# Patient Record
Sex: MALE | Race: WHITE | Employment: OTHER | URBAN - METROPOLITAN AREA
[De-identification: names, ages, dates, MRNs, and addresses within clinical notes are randomized per-mention and may not be internally consistent; named-entity substitution may affect disease eponyms.]

---

## 2017-07-19 ENCOUNTER — OFFICE VISIT (OUTPATIENT)
Dept: INTERNAL MEDICINE | Age: 67
End: 2017-07-19

## 2017-07-19 VITALS
SYSTOLIC BLOOD PRESSURE: 132 MMHG | WEIGHT: 216 LBS | DIASTOLIC BLOOD PRESSURE: 84 MMHG | OXYGEN SATURATION: 97 % | HEART RATE: 75 BPM

## 2017-07-19 DIAGNOSIS — Z00.00 ROUTINE GENERAL MEDICAL EXAMINATION AT A HEALTH CARE FACILITY: ICD-10-CM

## 2017-07-19 DIAGNOSIS — E11.9 TYPE 2 DIABETES MELLITUS WITHOUT COMPLICATION, WITHOUT LONG-TERM CURRENT USE OF INSULIN (HCC): ICD-10-CM

## 2017-07-19 DIAGNOSIS — N40.1 BENIGN NON-NODULAR PROSTATIC HYPERPLASIA WITH LOWER URINARY TRACT SYMPTOMS: ICD-10-CM

## 2017-07-19 DIAGNOSIS — Z12.5 ENCOUNTER FOR SCREENING FOR MALIGNANT NEOPLASM OF PROSTATE: ICD-10-CM

## 2017-07-19 DIAGNOSIS — N17.9 ACUTE RENAL FAILURE, UNSPECIFIED ACUTE RENAL FAILURE TYPE (HCC): ICD-10-CM

## 2017-07-19 DIAGNOSIS — J45.20 MILD INTERMITTENT ASTHMA WITHOUT COMPLICATION: ICD-10-CM

## 2017-07-19 DIAGNOSIS — I10 ESSENTIAL HYPERTENSION: ICD-10-CM

## 2017-07-19 DIAGNOSIS — E11.40 TYPE 2 DIABETES MELLITUS WITH DIABETIC NEUROPATHY, WITHOUT LONG-TERM CURRENT USE OF INSULIN (HCC): ICD-10-CM

## 2017-07-19 DIAGNOSIS — Z00.00 ENCOUNTER FOR MEDICARE ANNUAL WELLNESS EXAM: Primary | ICD-10-CM

## 2017-07-19 LAB
DIABETIC RETINOPATHY: NORMAL
HEMOCCULT STL QL: NEGATIVE
HEMOCCULT STL QL: NORMAL
HEMOCCULT STL QL: NORMAL

## 2017-07-19 PROCEDURE — G0402 INITIAL PREVENTIVE EXAM: HCPCS | Performed by: INTERNAL MEDICINE

## 2017-07-19 PROCEDURE — 4040F PNEUMOC VAC/ADMIN/RCVD: CPT | Performed by: INTERNAL MEDICINE

## 2017-07-19 PROCEDURE — G8421 BMI NOT CALCULATED: HCPCS | Performed by: INTERNAL MEDICINE

## 2017-07-19 PROCEDURE — 1036F TOBACCO NON-USER: CPT | Performed by: INTERNAL MEDICINE

## 2017-07-19 PROCEDURE — G0403 EKG FOR INITIAL PREVENT EXAM: HCPCS | Performed by: INTERNAL MEDICINE

## 2017-07-19 PROCEDURE — G8427 DOCREV CUR MEDS BY ELIG CLIN: HCPCS | Performed by: INTERNAL MEDICINE

## 2017-07-19 PROCEDURE — 82270 OCCULT BLOOD FECES: CPT | Performed by: INTERNAL MEDICINE

## 2017-07-19 PROCEDURE — 1123F ACP DISCUSS/DSCN MKR DOCD: CPT | Performed by: INTERNAL MEDICINE

## 2017-07-19 PROCEDURE — 3017F COLORECTAL CA SCREEN DOC REV: CPT | Performed by: INTERNAL MEDICINE

## 2017-07-19 PROCEDURE — 99202 OFFICE O/P NEW SF 15 MIN: CPT | Performed by: INTERNAL MEDICINE

## 2017-07-19 PROCEDURE — 3046F HEMOGLOBIN A1C LEVEL >9.0%: CPT | Performed by: INTERNAL MEDICINE

## 2017-07-19 RX ORDER — CYCLOBENZAPRINE HCL 10 MG
5 TABLET ORAL NIGHTLY
COMMUNITY
End: 2020-10-28 | Stop reason: ALTCHOICE

## 2017-07-19 RX ORDER — BUDESONIDE AND FORMOTEROL FUMARATE DIHYDRATE 160; 4.5 UG/1; UG/1
2 AEROSOL RESPIRATORY (INHALATION) 2 TIMES DAILY
Qty: 1 INHALER | Refills: 3
Start: 2017-07-19 | End: 2020-10-28 | Stop reason: ALTCHOICE

## 2017-07-19 RX ORDER — TAMSULOSIN HYDROCHLORIDE 0.4 MG/1
0.4 CAPSULE ORAL DAILY
Status: ON HOLD | COMMUNITY
End: 2017-07-26 | Stop reason: ALTCHOICE

## 2017-07-19 RX ORDER — LOSARTAN POTASSIUM 25 MG/1
25 TABLET ORAL DAILY
Status: ON HOLD | COMMUNITY
End: 2017-07-29 | Stop reason: HOSPADM

## 2017-07-19 RX ORDER — OMEPRAZOLE 20 MG/1
20 CAPSULE, DELAYED RELEASE ORAL DAILY
Status: ON HOLD | COMMUNITY
End: 2017-07-29 | Stop reason: HOSPADM

## 2017-07-19 RX ORDER — GLIPIZIDE 10 MG/1
10 TABLET ORAL 4 TIMES DAILY
COMMUNITY

## 2017-07-19 RX ORDER — ALBUTEROL SULFATE 90 UG/1
2 AEROSOL, METERED RESPIRATORY (INHALATION) EVERY 6 HOURS PRN
Qty: 1 INHALER | Refills: 3
Start: 2017-07-19 | End: 2020-10-28 | Stop reason: ALTCHOICE

## 2017-07-19 RX ORDER — DOCUSATE SODIUM 100 MG/1
100 CAPSULE, LIQUID FILLED ORAL 2 TIMES DAILY
Qty: 60 CAPSULE | Refills: 0
Start: 2017-07-19

## 2017-07-19 ASSESSMENT — ENCOUNTER SYMPTOMS
COLOR CHANGE: 0
ANAL BLEEDING: 0
EYE ITCHING: 0
RHINORRHEA: 0
EYE DISCHARGE: 0
WHEEZING: 0
CONSTIPATION: 0
TROUBLE SWALLOWING: 0
SINUS PRESSURE: 0
BLOOD IN STOOL: 0
VOMITING: 0
BACK PAIN: 0
STRIDOR: 0
APNEA: 0
DIARRHEA: 0
SHORTNESS OF BREATH: 0
RECTAL PAIN: 0
CHEST TIGHTNESS: 0
ABDOMINAL PAIN: 0
COUGH: 0
NAUSEA: 0
SORE THROAT: 0
EYE REDNESS: 0
FACIAL SWELLING: 0
PHOTOPHOBIA: 0
VOICE CHANGE: 0
CHOKING: 0
EYE PAIN: 0
ABDOMINAL DISTENTION: 0

## 2017-07-19 ASSESSMENT — LIFESTYLE VARIABLES
AUDIT TOTAL SCORE: 1
AUDIT-C TOTAL SCORE: 1
HOW OFTEN DO YOU HAVE A DRINK CONTAINING ALCOHOL: 1
HOW OFTEN DURING THE LAST YEAR HAVE YOU NEEDED AN ALCOHOLIC DRINK FIRST THING IN THE MORNING TO GET YOURSELF GOING AFTER A NIGHT OF HEAVY DRINKING: 0
HAVE YOU OR SOMEONE ELSE BEEN INJURED AS A RESULT OF YOUR DRINKING: 0
HAS A RELATIVE, FRIEND, DOCTOR, OR ANOTHER HEALTH PROFESSIONAL EXPRESSED CONCERN ABOUT YOUR DRINKING OR SUGGESTED YOU CUT DOWN: 0
HOW OFTEN DURING THE LAST YEAR HAVE YOU FAILED TO DO WHAT WAS NORMALLY EXPECTED FROM YOU BECAUSE OF DRINKING: 0
HOW OFTEN DURING THE LAST YEAR HAVE YOU FOUND THAT YOU WERE NOT ABLE TO STOP DRINKING ONCE YOU HAD STARTED: 0
HOW OFTEN DURING THE LAST YEAR HAVE YOU HAD A FEELING OF GUILT OR REMORSE AFTER DRINKING: 0
HOW OFTEN DURING THE LAST YEAR HAVE YOU BEEN UNABLE TO REMEMBER WHAT HAPPENED THE NIGHT BEFORE BECAUSE YOU HAD BEEN DRINKING: 0
HOW MANY STANDARD DRINKS CONTAINING ALCOHOL DO YOU HAVE ON A TYPICAL DAY: 0
HOW OFTEN DO YOU HAVE SIX OR MORE DRINKS ON ONE OCCASION: 0

## 2017-07-19 ASSESSMENT — ANXIETY QUESTIONNAIRES: GAD7 TOTAL SCORE: 1

## 2017-07-19 ASSESSMENT — PATIENT HEALTH QUESTIONNAIRE - PHQ9: SUM OF ALL RESPONSES TO PHQ QUESTIONS 1-9: 1

## 2017-07-25 ENCOUNTER — HOSPITAL ENCOUNTER (OUTPATIENT)
Dept: ULTRASOUND IMAGING | Age: 67
Discharge: HOME | End: 2017-07-25
Attending: INTERNAL MEDICINE | Admitting: INTERNAL MEDICINE

## 2017-07-25 DIAGNOSIS — N17.9 ACUTE RENAL FAILURE, UNSPECIFIED ACUTE RENAL FAILURE TYPE (HCC): ICD-10-CM

## 2017-07-25 DIAGNOSIS — I10 ESSENTIAL HYPERTENSION: ICD-10-CM

## 2017-07-25 DIAGNOSIS — E11.40 TYPE 2 DIABETES MELLITUS WITH DIABETIC NEUROPATHY, WITHOUT LONG-TERM CURRENT USE OF INSULIN (HCC): ICD-10-CM

## 2017-07-25 DIAGNOSIS — Z00.00 ENCOUNTER FOR MEDICARE ANNUAL WELLNESS EXAM: ICD-10-CM

## 2017-07-25 DIAGNOSIS — E11.40 TYPE 2 DIABETES MELLITUS WITH DIABETIC NEUROPATHY (HCC): ICD-10-CM

## 2017-07-25 DIAGNOSIS — Z12.5 ENCOUNTER FOR SCREENING FOR MALIGNANT NEOPLASM OF PROSTATE: ICD-10-CM

## 2017-07-25 PROBLEM — N30.00 ACUTE CYSTITIS: Status: ACTIVE | Noted: 2017-07-25

## 2017-07-25 LAB
A/G RATIO: 0.5 (ref 1.1–2.2)
ALBUMIN SERPL-MCNC: 3.4 G/DL (ref 3.4–5)
ALP BLD-CCNC: 323 U/L (ref 40–129)
ALT SERPL-CCNC: 20 U/L (ref 10–40)
ANION GAP SERPL CALCULATED.3IONS-SCNC: 19 MMOL/L (ref 3–16)
AST SERPL-CCNC: 13 U/L (ref 15–37)
BACTERIA: ABNORMAL /HPF
BASOPHILS ABSOLUTE: 0.1 K/UL (ref 0–0.2)
BASOPHILS RELATIVE PERCENT: 0.7 %
BILIRUB SERPL-MCNC: 0.3 MG/DL (ref 0–1)
BILIRUBIN URINE: NEGATIVE
BLOOD, URINE: ABNORMAL
BUN BLDV-MCNC: 52 MG/DL (ref 7–20)
CALCIUM SERPL-MCNC: 9.8 MG/DL (ref 8.3–10.6)
CHLORIDE BLD-SCNC: 97 MMOL/L (ref 99–110)
CHOLESTEROL, TOTAL: 133 MG/DL (ref 0–199)
CLARITY: ABNORMAL
CO2: 20 MMOL/L (ref 21–32)
COLOR: YELLOW
CREAT SERPL-MCNC: 2.9 MG/DL (ref 0.8–1.3)
CREATININE URINE: 70.8 MG/DL (ref 39–259)
EOSINOPHILS ABSOLUTE: 0.6 K/UL (ref 0–0.6)
EOSINOPHILS RELATIVE PERCENT: 6 %
EPITHELIAL CELLS, UA: ABNORMAL /HPF
GFR AFRICAN AMERICAN: 27
GFR NON-AFRICAN AMERICAN: 22
GLOBULIN: 6.2 G/DL
GLUCOSE BLD-MCNC: 234 MG/DL (ref 70–99)
GLUCOSE URINE: NEGATIVE MG/DL
HCT VFR BLD CALC: 34 % (ref 40.5–52.5)
HDLC SERPL-MCNC: 43 MG/DL (ref 40–60)
HEMOGLOBIN: 11 G/DL (ref 13.5–17.5)
KETONES, URINE: NEGATIVE MG/DL
LDL CHOLESTEROL CALCULATED: 53 MG/DL
LEUKOCYTE ESTERASE, URINE: ABNORMAL
LYMPHOCYTES ABSOLUTE: 2 K/UL (ref 1–5.1)
LYMPHOCYTES RELATIVE PERCENT: 21.8 %
MCH RBC QN AUTO: 27 PG (ref 26–34)
MCHC RBC AUTO-ENTMCNC: 32.3 G/DL (ref 31–36)
MCV RBC AUTO: 83.7 FL (ref 80–100)
MICROALBUMIN UR-MCNC: 36.5 MG/DL
MICROALBUMIN/CREAT UR-RTO: 515.5 MG/G (ref 0–30)
MICROSCOPIC EXAMINATION: YES
MONOCYTES ABSOLUTE: 1 K/UL (ref 0–1.3)
MONOCYTES RELATIVE PERCENT: 10.9 %
NEUTROPHILS ABSOLUTE: 5.6 K/UL (ref 1.7–7.7)
NEUTROPHILS RELATIVE PERCENT: 60.6 %
NITRITE, URINE: NEGATIVE
PDW BLD-RTO: 16.1 % (ref 12.4–15.4)
PH UA: 6
PLATELET # BLD: 557 K/UL (ref 135–450)
PMV BLD AUTO: 7.7 FL (ref 5–10.5)
POTASSIUM SERPL-SCNC: 4.8 MMOL/L (ref 3.5–5.1)
PROSTATE SPECIFIC ANTIGEN: 1.69 NG/ML (ref 0–4)
PROTEIN UA: 100 MG/DL
RBC # BLD: 4.06 M/UL (ref 4.2–5.9)
RBC UA: ABNORMAL /HPF (ref 0–2)
SODIUM BLD-SCNC: 136 MMOL/L (ref 136–145)
SPECIFIC GRAVITY UA: 1.01
TOTAL PROTEIN: 9.6 G/DL (ref 6.4–8.2)
TRIGL SERPL-MCNC: 184 MG/DL (ref 0–150)
TSH SERPL DL<=0.05 MIU/L-ACNC: 1.43 UIU/ML (ref 0.27–4.2)
UROBILINOGEN, URINE: 0.2 E.U./DL
VLDLC SERPL CALC-MCNC: 37 MG/DL
WBC # BLD: 9.3 K/UL (ref 4–11)
WBC UA: >100 /HPF (ref 0–5)
YEAST: PRESENT /HPF

## 2017-07-26 LAB
ESTIMATED AVERAGE GLUCOSE: 211.6 MG/DL
HBA1C MFR BLD: 9 %

## 2017-08-07 ENCOUNTER — TELEPHONE (OUTPATIENT)
Dept: INTERNAL MEDICINE | Age: 67
End: 2017-08-07

## 2017-08-08 ENCOUNTER — OFFICE VISIT (OUTPATIENT)
Dept: INFECTIOUS DISEASES | Age: 67
End: 2017-08-08

## 2017-08-08 VITALS
BODY MASS INDEX: 31.96 KG/M2 | DIASTOLIC BLOOD PRESSURE: 64 MMHG | SYSTOLIC BLOOD PRESSURE: 112 MMHG | TEMPERATURE: 98 F | WEIGHT: 198 LBS

## 2017-08-08 DIAGNOSIS — N39.0 COMPLICATED UTI (URINARY TRACT INFECTION): Primary | ICD-10-CM

## 2017-08-08 PROCEDURE — 1123F ACP DISCUSS/DSCN MKR DOCD: CPT | Performed by: INTERNAL MEDICINE

## 2017-08-08 PROCEDURE — 3017F COLORECTAL CA SCREEN DOC REV: CPT | Performed by: INTERNAL MEDICINE

## 2017-08-08 PROCEDURE — 4040F PNEUMOC VAC/ADMIN/RCVD: CPT | Performed by: INTERNAL MEDICINE

## 2017-08-08 PROCEDURE — G8427 DOCREV CUR MEDS BY ELIG CLIN: HCPCS | Performed by: INTERNAL MEDICINE

## 2017-08-08 PROCEDURE — G8417 CALC BMI ABV UP PARAM F/U: HCPCS | Performed by: INTERNAL MEDICINE

## 2017-08-08 PROCEDURE — 99214 OFFICE O/P EST MOD 30 MIN: CPT | Performed by: INTERNAL MEDICINE

## 2017-08-08 PROCEDURE — 1036F TOBACCO NON-USER: CPT | Performed by: INTERNAL MEDICINE

## 2017-08-08 PROCEDURE — 1111F DSCHRG MED/CURRENT MED MERGE: CPT | Performed by: INTERNAL MEDICINE

## 2017-08-17 ENCOUNTER — OFFICE VISIT (OUTPATIENT)
Dept: INTERNAL MEDICINE | Age: 67
End: 2017-08-17

## 2017-08-17 VITALS
DIASTOLIC BLOOD PRESSURE: 88 MMHG | SYSTOLIC BLOOD PRESSURE: 140 MMHG | HEART RATE: 87 BPM | HEIGHT: 66 IN | WEIGHT: 197 LBS | TEMPERATURE: 97.7 F | BODY MASS INDEX: 31.66 KG/M2 | OXYGEN SATURATION: 98 %

## 2017-08-17 DIAGNOSIS — I10 ESSENTIAL HYPERTENSION: ICD-10-CM

## 2017-08-17 DIAGNOSIS — N39.0 COMPLICATED UTI (URINARY TRACT INFECTION): ICD-10-CM

## 2017-08-17 DIAGNOSIS — Z01.818 PRE-OP EVALUATION: ICD-10-CM

## 2017-08-17 DIAGNOSIS — Z01.818 PRE-OP EVALUATION: Primary | ICD-10-CM

## 2017-08-17 DIAGNOSIS — E87.1 HYPONATREMIA: Primary | ICD-10-CM

## 2017-08-17 DIAGNOSIS — E11.9 TYPE 2 DIABETES MELLITUS WITHOUT COMPLICATION, WITHOUT LONG-TERM CURRENT USE OF INSULIN (HCC): ICD-10-CM

## 2017-08-17 LAB
ANION GAP SERPL CALCULATED.3IONS-SCNC: 15 MMOL/L (ref 3–16)
BUN BLDV-MCNC: 39 MG/DL (ref 7–20)
CALCIUM SERPL-MCNC: 9.4 MG/DL (ref 8.3–10.6)
CHLORIDE BLD-SCNC: 90 MMOL/L (ref 99–110)
CO2: 23 MMOL/L (ref 21–32)
CREAT SERPL-MCNC: 1.4 MG/DL (ref 0.8–1.3)
GFR AFRICAN AMERICAN: >60
GFR NON-AFRICAN AMERICAN: 51
GLUCOSE BLD-MCNC: 435 MG/DL (ref 70–99)
HCT VFR BLD CALC: 39.5 % (ref 40.5–52.5)
HEMOGLOBIN: 13 G/DL (ref 13.5–17.5)
MCH RBC QN AUTO: 27.3 PG (ref 26–34)
MCHC RBC AUTO-ENTMCNC: 32.9 G/DL (ref 31–36)
MCV RBC AUTO: 82.8 FL (ref 80–100)
PDW BLD-RTO: 18.5 % (ref 12.4–15.4)
PLATELET # BLD: 208 K/UL (ref 135–450)
PMV BLD AUTO: 8.9 FL (ref 5–10.5)
POTASSIUM SERPL-SCNC: 4.5 MMOL/L (ref 3.5–5.1)
RBC # BLD: 4.77 M/UL (ref 4.2–5.9)
SODIUM BLD-SCNC: 128 MMOL/L (ref 136–145)
WBC # BLD: 8.4 K/UL (ref 4–11)

## 2017-08-17 PROCEDURE — 4040F PNEUMOC VAC/ADMIN/RCVD: CPT | Performed by: NURSE PRACTITIONER

## 2017-08-17 PROCEDURE — G8427 DOCREV CUR MEDS BY ELIG CLIN: HCPCS | Performed by: NURSE PRACTITIONER

## 2017-08-17 PROCEDURE — G8417 CALC BMI ABV UP PARAM F/U: HCPCS | Performed by: NURSE PRACTITIONER

## 2017-08-17 PROCEDURE — 99215 OFFICE O/P EST HI 40 MIN: CPT | Performed by: NURSE PRACTITIONER

## 2017-08-17 PROCEDURE — 1036F TOBACCO NON-USER: CPT | Performed by: NURSE PRACTITIONER

## 2017-08-17 PROCEDURE — 3017F COLORECTAL CA SCREEN DOC REV: CPT | Performed by: NURSE PRACTITIONER

## 2017-08-17 RX ORDER — AMLODIPINE BESYLATE 10 MG/1
10 TABLET ORAL DAILY
Qty: 30 TABLET | Refills: 3 | Status: SHIPPED | OUTPATIENT
Start: 2017-08-17 | End: 2017-10-17

## 2017-08-17 ASSESSMENT — ENCOUNTER SYMPTOMS
BLOOD IN STOOL: 0
BACK PAIN: 0
NAUSEA: 0
EYE DISCHARGE: 0
SHORTNESS OF BREATH: 0
HEARTBURN: 0
CONSTIPATION: 0
DIARRHEA: 0
COUGH: 0
SORE THROAT: 0
BLURRED VISION: 0
ORTHOPNEA: 0
EYE PAIN: 0
VOMITING: 0
ABDOMINAL PAIN: 0
STRIDOR: 0
HEMOPTYSIS: 0
PHOTOPHOBIA: 0
SPUTUM PRODUCTION: 0
WHEEZING: 0
EYE REDNESS: 0
DOUBLE VISION: 0

## 2017-08-18 ENCOUNTER — PATIENT MESSAGE (OUTPATIENT)
Dept: INTERNAL MEDICINE | Age: 67
End: 2017-08-18

## 2017-08-22 ENCOUNTER — TELEPHONE (OUTPATIENT)
Dept: INTERNAL MEDICINE | Age: 67
End: 2017-08-22

## 2017-08-22 DIAGNOSIS — E87.1 HYPONATREMIA: ICD-10-CM

## 2017-08-22 LAB
ANION GAP SERPL CALCULATED.3IONS-SCNC: 15 MMOL/L (ref 3–16)
BUN BLDV-MCNC: 35 MG/DL (ref 7–20)
CALCIUM SERPL-MCNC: 9.3 MG/DL (ref 8.3–10.6)
CHLORIDE BLD-SCNC: 96 MMOL/L (ref 99–110)
CO2: 23 MMOL/L (ref 21–32)
CREAT SERPL-MCNC: 1.1 MG/DL (ref 0.8–1.3)
GFR AFRICAN AMERICAN: >60
GFR NON-AFRICAN AMERICAN: >60
GLUCOSE BLD-MCNC: 275 MG/DL (ref 70–99)
POTASSIUM SERPL-SCNC: 4.2 MMOL/L (ref 3.5–5.1)
SODIUM BLD-SCNC: 134 MMOL/L (ref 136–145)

## 2017-08-30 DIAGNOSIS — E11.9 TYPE 2 DIABETES MELLITUS WITHOUT COMPLICATION, UNSPECIFIED LONG TERM INSULIN USE STATUS: Primary | ICD-10-CM

## 2017-09-11 ENCOUNTER — OFFICE VISIT (OUTPATIENT)
Dept: INTERNAL MEDICINE | Age: 67
End: 2017-09-11

## 2017-09-11 ENCOUNTER — TELEPHONE (OUTPATIENT)
Dept: INTERNAL MEDICINE | Age: 67
End: 2017-09-11

## 2017-09-11 VITALS
HEART RATE: 98 BPM | BODY MASS INDEX: 33.91 KG/M2 | HEIGHT: 66 IN | SYSTOLIC BLOOD PRESSURE: 122 MMHG | WEIGHT: 211 LBS | DIASTOLIC BLOOD PRESSURE: 78 MMHG

## 2017-09-11 DIAGNOSIS — N40.1 BENIGN NON-NODULAR PROSTATIC HYPERPLASIA WITH LOWER URINARY TRACT SYMPTOMS: ICD-10-CM

## 2017-09-11 DIAGNOSIS — E11.9 TYPE 2 DIABETES MELLITUS WITHOUT COMPLICATION, UNSPECIFIED LONG TERM INSULIN USE STATUS: ICD-10-CM

## 2017-09-11 DIAGNOSIS — F51.01 PRIMARY INSOMNIA: Primary | ICD-10-CM

## 2017-09-11 DIAGNOSIS — E11.9 TYPE 2 DIABETES MELLITUS WITHOUT COMPLICATION, UNSPECIFIED LONG TERM INSULIN USE STATUS: Primary | ICD-10-CM

## 2017-09-11 DIAGNOSIS — F51.01 PRIMARY INSOMNIA: ICD-10-CM

## 2017-09-11 PROCEDURE — 4040F PNEUMOC VAC/ADMIN/RCVD: CPT | Performed by: NURSE PRACTITIONER

## 2017-09-11 PROCEDURE — 3017F COLORECTAL CA SCREEN DOC REV: CPT | Performed by: NURSE PRACTITIONER

## 2017-09-11 PROCEDURE — 3046F HEMOGLOBIN A1C LEVEL >9.0%: CPT | Performed by: NURSE PRACTITIONER

## 2017-09-11 PROCEDURE — G8417 CALC BMI ABV UP PARAM F/U: HCPCS | Performed by: NURSE PRACTITIONER

## 2017-09-11 PROCEDURE — G8427 DOCREV CUR MEDS BY ELIG CLIN: HCPCS | Performed by: NURSE PRACTITIONER

## 2017-09-11 PROCEDURE — 99214 OFFICE O/P EST MOD 30 MIN: CPT | Performed by: NURSE PRACTITIONER

## 2017-09-11 PROCEDURE — 1123F ACP DISCUSS/DSCN MKR DOCD: CPT | Performed by: NURSE PRACTITIONER

## 2017-09-11 PROCEDURE — 1036F TOBACCO NON-USER: CPT | Performed by: NURSE PRACTITIONER

## 2017-09-11 RX ORDER — SPIRONOLACTONE 25 MG/1
25 TABLET ORAL DAILY
COMMUNITY
End: 2018-01-23

## 2017-09-11 ASSESSMENT — ENCOUNTER SYMPTOMS
SHORTNESS OF BREATH: 0
WHEEZING: 0
COUGH: 0

## 2017-09-12 ENCOUNTER — HOSPITAL ENCOUNTER (OUTPATIENT)
Dept: OTHER | Age: 67
Discharge: HOME | End: 2017-09-12
Attending: PHYSICIAN ASSISTANT | Admitting: PHYSICIAN ASSISTANT

## 2017-09-12 LAB
ANION GAP SERPL CALCULATED.3IONS-SCNC: 13 MMOL/L (ref 3–16)
BUN BLDV-MCNC: 27 MG/DL (ref 7–20)
CALCIUM SERPL-MCNC: 8.9 MG/DL (ref 8.3–10.6)
CHLORIDE BLD-SCNC: 102 MMOL/L (ref 99–110)
CO2: 25 MMOL/L (ref 21–32)
CREAT SERPL-MCNC: 1 MG/DL (ref 0.8–1.3)
ESTIMATED AVERAGE GLUCOSE: 257.5 MG/DL
GFR AFRICAN AMERICAN: >60
GFR NON-AFRICAN AMERICAN: >60
GLUCOSE BLD-MCNC: 173 MG/DL (ref 70–99)
HBA1C MFR BLD: 10.6 %
POTASSIUM SERPL-SCNC: 4.2 MMOL/L (ref 3.5–5.1)
SODIUM BLD-SCNC: 140 MMOL/L (ref 136–145)

## 2017-09-19 ENCOUNTER — TELEPHONE (OUTPATIENT)
Dept: INFECTIOUS DISEASES | Age: 67
End: 2017-09-19

## 2017-09-20 ENCOUNTER — TELEPHONE (OUTPATIENT)
Dept: INFECTIOUS DISEASES | Age: 67
End: 2017-09-20

## 2017-10-17 ENCOUNTER — OFFICE VISIT (OUTPATIENT)
Dept: INTERNAL MEDICINE | Age: 67
End: 2017-10-17

## 2017-10-17 VITALS
OXYGEN SATURATION: 98 % | HEIGHT: 66 IN | DIASTOLIC BLOOD PRESSURE: 64 MMHG | SYSTOLIC BLOOD PRESSURE: 120 MMHG | HEART RATE: 72 BPM | WEIGHT: 210.8 LBS | BODY MASS INDEX: 33.88 KG/M2

## 2017-10-17 DIAGNOSIS — Z23 NEED FOR INFLUENZA VACCINATION: ICD-10-CM

## 2017-10-17 DIAGNOSIS — F51.01 PRIMARY INSOMNIA: ICD-10-CM

## 2017-10-17 DIAGNOSIS — I10 ESSENTIAL HYPERTENSION: ICD-10-CM

## 2017-10-17 DIAGNOSIS — J45.20 MILD INTERMITTENT ASTHMA WITHOUT COMPLICATION: ICD-10-CM

## 2017-10-17 DIAGNOSIS — E11.9 TYPE 2 DIABETES MELLITUS WITHOUT COMPLICATION, UNSPECIFIED LONG TERM INSULIN USE STATUS: Primary | ICD-10-CM

## 2017-10-17 DIAGNOSIS — Z23 NEED FOR PNEUMOCOCCAL VACCINATION: ICD-10-CM

## 2017-10-17 PROBLEM — N30.00 ACUTE CYSTITIS: Status: RESOLVED | Noted: 2017-07-25 | Resolved: 2017-10-17

## 2017-10-17 LAB — HBA1C MFR BLD: 8.2 %

## 2017-10-17 PROCEDURE — 1036F TOBACCO NON-USER: CPT | Performed by: INTERNAL MEDICINE

## 2017-10-17 PROCEDURE — 90662 IIV NO PRSV INCREASED AG IM: CPT | Performed by: INTERNAL MEDICINE

## 2017-10-17 PROCEDURE — 1123F ACP DISCUSS/DSCN MKR DOCD: CPT | Performed by: INTERNAL MEDICINE

## 2017-10-17 PROCEDURE — G8427 DOCREV CUR MEDS BY ELIG CLIN: HCPCS | Performed by: INTERNAL MEDICINE

## 2017-10-17 PROCEDURE — 83036 HEMOGLOBIN GLYCOSYLATED A1C: CPT | Performed by: INTERNAL MEDICINE

## 2017-10-17 PROCEDURE — 3046F HEMOGLOBIN A1C LEVEL >9.0%: CPT | Performed by: INTERNAL MEDICINE

## 2017-10-17 PROCEDURE — 90670 PCV13 VACCINE IM: CPT | Performed by: INTERNAL MEDICINE

## 2017-10-17 PROCEDURE — 3017F COLORECTAL CA SCREEN DOC REV: CPT | Performed by: INTERNAL MEDICINE

## 2017-10-17 PROCEDURE — 4040F PNEUMOC VAC/ADMIN/RCVD: CPT | Performed by: INTERNAL MEDICINE

## 2017-10-17 PROCEDURE — G8484 FLU IMMUNIZE NO ADMIN: HCPCS | Performed by: INTERNAL MEDICINE

## 2017-10-17 PROCEDURE — G0008 ADMIN INFLUENZA VIRUS VAC: HCPCS | Performed by: INTERNAL MEDICINE

## 2017-10-17 PROCEDURE — G0009 ADMIN PNEUMOCOCCAL VACCINE: HCPCS | Performed by: INTERNAL MEDICINE

## 2017-10-17 PROCEDURE — 99214 OFFICE O/P EST MOD 30 MIN: CPT | Performed by: INTERNAL MEDICINE

## 2017-10-17 PROCEDURE — G8417 CALC BMI ABV UP PARAM F/U: HCPCS | Performed by: INTERNAL MEDICINE

## 2017-10-17 RX ORDER — ZALEPLON 10 MG/1
10 CAPSULE ORAL NIGHTLY
Qty: 30 CAPSULE | Refills: 0 | Status: SHIPPED | OUTPATIENT
Start: 2017-10-17 | End: 2017-11-15 | Stop reason: SDUPTHER

## 2017-10-17 ASSESSMENT — ENCOUNTER SYMPTOMS
SHORTNESS OF BREATH: 0
CHEST TIGHTNESS: 0
WHEEZING: 0
RESPIRATORY NEGATIVE: 1
GASTROINTESTINAL NEGATIVE: 1

## 2017-10-17 NOTE — PROGRESS NOTES
Subjective:      Patient ID: Bryant Cabot is a 74 y.o. male. HPI    Patient is here for a recheck    Vitals:    10/17/17 1128   BP: 120/64   Pulse: 72   SpO2: 98%       Wt Readings from Last 3 Encounters:   10/17/17 210 lb 12.8 oz (95.6 kg)   09/11/17 211 lb (95.7 kg)   08/17/17 197 lb (89.4 kg)     Blood sugar is running . He is taking Glipizide, Metformin and [place]. Patient is taking blood pressure medication without problem        Discussed use, benefit, and side effects of prescribed medications. Barriers to medication compliance addressed. All patient questions answered. Pt voiced understanding. Review of Systems   Constitutional: Negative. HENT: Negative. Respiratory: Negative. Negative for chest tightness, shortness of breath and wheezing. Cardiovascular: Negative. Negative for chest pain, palpitations and leg swelling. Gastrointestinal: Negative. Genitourinary: Negative. Musculoskeletal: Negative for arthralgias and myalgias. Neurological: Negative. Objective:   Physical Exam   Constitutional: He appears well-developed and well-nourished. Neck: Trachea normal. No JVD present. Carotid bruit is not present. No edema present. Cardiovascular: Normal rate, regular rhythm, S1 normal and S2 normal.    No murmur heard. Pulmonary/Chest: Effort normal and breath sounds normal. He has no wheezes. He has no rhonchi. He has no rales.        Assessment:      See Problem List assessment and plan       Plan:      Diabetes mellitus, type 2 (HCC)  Blood sugar improved  Check HgbA1c  Consider stopping [place]    Hypertension  BP stable  Continue present treatment      Asthma  Breathing well    Primary insomnia  Discussed issues  No help with Belsomra, melatonin, Benadryl  Trial [facility]

## 2017-10-17 NOTE — PATIENT INSTRUCTIONS
Patient Self-Management Goal for Chronic Condition  Goal: I will schedule the recommended follow up visit when leaving the office today, and agree to keep the appointment or to reschedule when I call to cancel. Barriers to success: none  Plan for overcoming my barriers: N/A     Confidence: 10/10  Date goal set: 10/17/17  Date goal attained:   Self- Management Goals for the Patient with High Cholesterol: It is important to have goals to work towards when you have High Cholesterol. Below is a list of goals your doctor would like you to work towards to help control your hyperlipidemia and also maintain and improve your overall health. Please select one of these goals to try before your next follow up visit:    Goal: I will schedule my follow-up appointment for high cholesterol at checkout desk today. I will keep the appointment or, if necessary, reschedule within a reasonable timeframe. Guess your barriers before they happen. Everyone runs into barriers to their goals. You may already know what's going to get in your way. Write down these problems (cost? time? stress? fear?), and think of ways to get around them.      Barriers to success: none  Plan for overcoming my barriers: N/A     Confidence: 10/10  Date goal set: 10/17/17  Date goal attained:

## 2017-11-15 RX ORDER — ZALEPLON 10 MG/1
CAPSULE ORAL
Qty: 30 CAPSULE | Refills: 0 | Status: SHIPPED | OUTPATIENT
Start: 2017-11-15 | End: 2018-01-23

## 2018-01-23 ENCOUNTER — OFFICE VISIT (OUTPATIENT)
Dept: INTERNAL MEDICINE | Age: 68
End: 2018-01-23

## 2018-01-23 VITALS
HEIGHT: 66 IN | WEIGHT: 212 LBS | HEART RATE: 74 BPM | DIASTOLIC BLOOD PRESSURE: 68 MMHG | SYSTOLIC BLOOD PRESSURE: 126 MMHG | OXYGEN SATURATION: 98 % | BODY MASS INDEX: 34.07 KG/M2

## 2018-01-23 DIAGNOSIS — R35.0 BENIGN PROSTATIC HYPERPLASIA WITH URINARY FREQUENCY: ICD-10-CM

## 2018-01-23 DIAGNOSIS — E11.9 TYPE 2 DIABETES MELLITUS WITHOUT COMPLICATION, WITHOUT LONG-TERM CURRENT USE OF INSULIN (HCC): ICD-10-CM

## 2018-01-23 DIAGNOSIS — I10 ESSENTIAL HYPERTENSION: ICD-10-CM

## 2018-01-23 DIAGNOSIS — N40.1 BENIGN PROSTATIC HYPERPLASIA WITH URINARY FREQUENCY: ICD-10-CM

## 2018-01-23 DIAGNOSIS — J45.20 MILD INTERMITTENT ASTHMA WITHOUT COMPLICATION: ICD-10-CM

## 2018-01-23 PROCEDURE — 99214 OFFICE O/P EST MOD 30 MIN: CPT | Performed by: INTERNAL MEDICINE

## 2018-01-23 PROCEDURE — 4040F PNEUMOC VAC/ADMIN/RCVD: CPT | Performed by: INTERNAL MEDICINE

## 2018-01-23 PROCEDURE — G8417 CALC BMI ABV UP PARAM F/U: HCPCS | Performed by: INTERNAL MEDICINE

## 2018-01-23 PROCEDURE — G8427 DOCREV CUR MEDS BY ELIG CLIN: HCPCS | Performed by: INTERNAL MEDICINE

## 2018-01-23 PROCEDURE — 1123F ACP DISCUSS/DSCN MKR DOCD: CPT | Performed by: INTERNAL MEDICINE

## 2018-01-23 PROCEDURE — 3017F COLORECTAL CA SCREEN DOC REV: CPT | Performed by: INTERNAL MEDICINE

## 2018-01-23 PROCEDURE — G8484 FLU IMMUNIZE NO ADMIN: HCPCS | Performed by: INTERNAL MEDICINE

## 2018-01-23 PROCEDURE — 1036F TOBACCO NON-USER: CPT | Performed by: INTERNAL MEDICINE

## 2018-01-23 PROCEDURE — 3046F HEMOGLOBIN A1C LEVEL >9.0%: CPT | Performed by: INTERNAL MEDICINE

## 2018-01-23 RX ORDER — VITAMIN B COMPLEX
1 CAPSULE ORAL DAILY
COMMUNITY

## 2018-01-23 RX ORDER — LOSARTAN POTASSIUM 50 MG/1
50 TABLET ORAL
COMMUNITY

## 2018-01-23 RX ORDER — TAMSULOSIN HYDROCHLORIDE 0.4 MG/1
0.4 CAPSULE ORAL DAILY
COMMUNITY

## 2018-01-23 RX ORDER — LATANOPROST 50 UG/ML
1 SOLUTION/ DROPS OPHTHALMIC NIGHTLY
COMMUNITY

## 2018-01-23 RX ORDER — M-VIT,TX,IRON,MINS/CALC/FOLIC 27MG-0.4MG
1 TABLET ORAL DAILY
COMMUNITY

## 2018-01-23 ASSESSMENT — ENCOUNTER SYMPTOMS
GASTROINTESTINAL NEGATIVE: 1
RESPIRATORY NEGATIVE: 1
WHEEZING: 0
CHEST TIGHTNESS: 0
SHORTNESS OF BREATH: 0

## 2018-02-01 ENCOUNTER — HOSPITAL ENCOUNTER (OUTPATIENT)
Dept: OTHER | Age: 68
Discharge: HOME | End: 2018-02-01
Attending: INTERNAL MEDICINE | Admitting: INTERNAL MEDICINE

## 2018-02-01 DIAGNOSIS — I10 ESSENTIAL HYPERTENSION: ICD-10-CM

## 2018-02-01 DIAGNOSIS — E11.9 TYPE 2 DIABETES MELLITUS WITHOUT COMPLICATION, WITHOUT LONG-TERM CURRENT USE OF INSULIN (HCC): ICD-10-CM

## 2018-02-01 DIAGNOSIS — J45.20 MILD INTERMITTENT ASTHMA WITHOUT COMPLICATION: ICD-10-CM

## 2018-02-01 LAB
A/G RATIO: 1.2 (ref 1.1–2.2)
ALBUMIN SERPL-MCNC: 4.2 G/DL (ref 3.4–5)
ALP BLD-CCNC: 87 U/L (ref 40–129)
ALT SERPL-CCNC: 18 U/L (ref 10–40)
ANION GAP SERPL CALCULATED.3IONS-SCNC: 11 MMOL/L (ref 3–16)
AST SERPL-CCNC: 20 U/L (ref 15–37)
BACTERIA: ABNORMAL /HPF
BASOPHILS ABSOLUTE: 0.1 K/UL (ref 0–0.2)
BASOPHILS RELATIVE PERCENT: 0.8 %
BILIRUB SERPL-MCNC: 0.3 MG/DL (ref 0–1)
BILIRUBIN URINE: NEGATIVE
BLOOD, URINE: ABNORMAL
BUN BLDV-MCNC: 26 MG/DL (ref 7–20)
CALCIUM SERPL-MCNC: 9.3 MG/DL (ref 8.3–10.6)
CHLORIDE BLD-SCNC: 100 MMOL/L (ref 99–110)
CHOLESTEROL, TOTAL: 146 MG/DL (ref 0–199)
CLARITY: CLEAR
CO2: 27 MMOL/L (ref 21–32)
COLOR: YELLOW
CREAT SERPL-MCNC: 1.1 MG/DL (ref 0.8–1.3)
CREATININE URINE: 58.3 MG/DL (ref 39–259)
EOSINOPHILS ABSOLUTE: 0.4 K/UL (ref 0–0.6)
EOSINOPHILS RELATIVE PERCENT: 6.6 %
GFR AFRICAN AMERICAN: >60
GFR NON-AFRICAN AMERICAN: >60
GLOBULIN: 3.5 G/DL
GLUCOSE BLD-MCNC: 122 MG/DL (ref 70–99)
GLUCOSE URINE: NEGATIVE MG/DL
HCT VFR BLD CALC: 43 % (ref 40.5–52.5)
HDLC SERPL-MCNC: 62 MG/DL (ref 40–60)
HEMOGLOBIN: 14.2 G/DL (ref 13.5–17.5)
KETONES, URINE: NEGATIVE MG/DL
LDL CHOLESTEROL CALCULATED: 45 MG/DL
LEUKOCYTE ESTERASE, URINE: ABNORMAL
LYMPHOCYTES ABSOLUTE: 2.3 K/UL (ref 1–5.1)
LYMPHOCYTES RELATIVE PERCENT: 35.1 %
MCH RBC QN AUTO: 28 PG (ref 26–34)
MCHC RBC AUTO-ENTMCNC: 33.1 G/DL (ref 31–36)
MCV RBC AUTO: 84.4 FL (ref 80–100)
MICROALBUMIN UR-MCNC: 10.1 MG/DL
MICROALBUMIN/CREAT UR-RTO: 173.2 MG/G (ref 0–30)
MICROSCOPIC EXAMINATION: YES
MONOCYTES ABSOLUTE: 0.7 K/UL (ref 0–1.3)
MONOCYTES RELATIVE PERCENT: 10.1 %
NEUTROPHILS ABSOLUTE: 3.1 K/UL (ref 1.7–7.7)
NEUTROPHILS RELATIVE PERCENT: 47.4 %
NITRITE, URINE: NEGATIVE
PDW BLD-RTO: 16.5 % (ref 12.4–15.4)
PH UA: 5.5
PHOSPHORUS: 3.3 MG/DL (ref 2.5–4.9)
PLATELET # BLD: 272 K/UL (ref 135–450)
PMV BLD AUTO: 7.6 FL (ref 5–10.5)
POTASSIUM SERPL-SCNC: 4.7 MMOL/L (ref 3.5–5.1)
PROTEIN UA: 30 MG/DL
RBC # BLD: 5.09 M/UL (ref 4.2–5.9)
RBC UA: ABNORMAL /HPF (ref 0–2)
SODIUM BLD-SCNC: 138 MMOL/L (ref 136–145)
SPECIFIC GRAVITY UA: 1.02
TOTAL PROTEIN: 7.7 G/DL (ref 6.4–8.2)
TRIGL SERPL-MCNC: 195 MG/DL (ref 0–150)
UROBILINOGEN, URINE: 0.2 E.U./DL
VLDLC SERPL CALC-MCNC: 39 MG/DL
WBC # BLD: 6.6 K/UL (ref 4–11)
WBC UA: >100 /HPF (ref 0–5)

## 2018-02-02 LAB
ESTIMATED AVERAGE GLUCOSE: 177.2 MG/DL
HBA1C MFR BLD: 7.8 %

## 2018-02-05 ENCOUNTER — CARE COORDINATION (OUTPATIENT)
Dept: CARE COORDINATION | Age: 68
End: 2018-02-05

## 2018-02-06 DIAGNOSIS — R82.90 ABNORMAL URINE: Primary | ICD-10-CM

## 2018-02-22 NOTE — CARE COORDINATION
Patient Excluded from Care Coordination?  Yes     The patient will be excluded from Care Coordination for the following reason: Patient unable to contact to enroll

## 2018-03-09 ENCOUNTER — HOSPITAL ENCOUNTER (OUTPATIENT)
Dept: ULTRASOUND IMAGING | Age: 68
Discharge: HOME | End: 2018-03-09
Attending: PHYSICIAN ASSISTANT | Admitting: PHYSICIAN ASSISTANT

## 2018-03-09 DIAGNOSIS — R31.21 ASYMPTOMATIC MICROSCOPIC HEMATURIA: ICD-10-CM

## 2018-03-15 ENCOUNTER — HOSPITAL ENCOUNTER (OUTPATIENT)
Dept: CT IMAGING | Age: 68
Discharge: HOME | End: 2018-03-15
Attending: PHYSICIAN ASSISTANT | Admitting: PHYSICIAN ASSISTANT

## 2018-03-15 DIAGNOSIS — R31.21 ASYMPTOMATIC MICROSCOPIC HEMATURIA: ICD-10-CM

## 2018-03-15 LAB
BUN BLDV-MCNC: 24 MG/DL (ref 7–20)
CREAT SERPL-MCNC: 1.2 MG/DL (ref 0.8–1.3)
GFR AFRICAN AMERICAN: >60
GFR NON-AFRICAN AMERICAN: >60

## 2018-04-04 ENCOUNTER — HOSPITAL ENCOUNTER (OUTPATIENT)
Dept: OTHER | Age: 68
Discharge: HOME | End: 2018-04-04
Attending: UROLOGY | Admitting: UROLOGY

## 2018-04-04 DIAGNOSIS — N20.0 CALCULUS OF KIDNEY: ICD-10-CM

## 2018-05-10 ENCOUNTER — OFFICE VISIT (OUTPATIENT)
Dept: INTERNAL MEDICINE | Age: 68
End: 2018-05-10

## 2018-05-10 VITALS
BODY MASS INDEX: 35.2 KG/M2 | WEIGHT: 219 LBS | HEIGHT: 66 IN | TEMPERATURE: 98.3 F | OXYGEN SATURATION: 99 % | DIASTOLIC BLOOD PRESSURE: 70 MMHG | SYSTOLIC BLOOD PRESSURE: 120 MMHG | HEART RATE: 60 BPM

## 2018-05-10 DIAGNOSIS — E11.9 TYPE 2 DIABETES MELLITUS WITHOUT COMPLICATION, WITHOUT LONG-TERM CURRENT USE OF INSULIN (HCC): ICD-10-CM

## 2018-05-10 DIAGNOSIS — Z01.818 PRE-OP EXAM: Primary | ICD-10-CM

## 2018-05-10 DIAGNOSIS — N20.0 KIDNEY STONE: ICD-10-CM

## 2018-05-10 DIAGNOSIS — J45.20 MILD INTERMITTENT ASTHMA WITHOUT COMPLICATION: ICD-10-CM

## 2018-05-10 DIAGNOSIS — I10 ESSENTIAL HYPERTENSION: ICD-10-CM

## 2018-05-10 PROCEDURE — 2022F DILAT RTA XM EVC RTNOPTHY: CPT | Performed by: NURSE PRACTITIONER

## 2018-05-10 PROCEDURE — 99215 OFFICE O/P EST HI 40 MIN: CPT | Performed by: NURSE PRACTITIONER

## 2018-05-10 PROCEDURE — G8417 CALC BMI ABV UP PARAM F/U: HCPCS | Performed by: NURSE PRACTITIONER

## 2018-05-10 PROCEDURE — 3017F COLORECTAL CA SCREEN DOC REV: CPT | Performed by: NURSE PRACTITIONER

## 2018-05-10 PROCEDURE — 93000 ELECTROCARDIOGRAM COMPLETE: CPT | Performed by: NURSE PRACTITIONER

## 2018-05-10 PROCEDURE — G8427 DOCREV CUR MEDS BY ELIG CLIN: HCPCS | Performed by: NURSE PRACTITIONER

## 2018-05-10 ASSESSMENT — ENCOUNTER SYMPTOMS
BACK PAIN: 0
WHEEZING: 0
SHORTNESS OF BREATH: 0
EYE PAIN: 0
EYE DISCHARGE: 0
ORTHOPNEA: 0
VOMITING: 0
COUGH: 0
BLURRED VISION: 0
SINUS PAIN: 0
SORE THROAT: 0
DIARRHEA: 0
BLOOD IN STOOL: 0
DOUBLE VISION: 0
SPUTUM PRODUCTION: 0
HEARTBURN: 0
EYE REDNESS: 0
NAUSEA: 0
STRIDOR: 0
PHOTOPHOBIA: 0
HEMOPTYSIS: 0
ABDOMINAL PAIN: 0
CONSTIPATION: 0

## 2018-06-01 ENCOUNTER — HOSPITAL ENCOUNTER (OUTPATIENT)
Dept: OTHER | Age: 68
Discharge: HOME | End: 2018-06-02
Attending: UROLOGY | Admitting: UROLOGY

## 2018-06-01 ENCOUNTER — HOSPITAL ENCOUNTER (OUTPATIENT)
Dept: GENERAL RADIOLOGY | Age: 68
Discharge: HOME | End: 2018-06-01

## 2018-06-01 DIAGNOSIS — N20.0 KIDNEY STONE: ICD-10-CM

## 2018-09-26 PROBLEM — Z00.00 ROUTINE GENERAL MEDICAL EXAMINATION AT A HEALTH CARE FACILITY: Status: RESOLVED | Noted: 2017-07-19 | Resolved: 2018-09-26

## 2018-11-28 ENCOUNTER — HOSPITAL ENCOUNTER (OUTPATIENT)
Dept: GENERAL RADIOLOGY | Age: 68
Discharge: HOME | End: 2018-11-28
Payer: MEDICARE

## 2018-11-28 DIAGNOSIS — N20.0 KIDNEY STONES: ICD-10-CM

## 2018-11-28 PROCEDURE — 74018 RADEX ABDOMEN 1 VIEW: CPT

## 2019-12-09 ENCOUNTER — HOSPITAL ENCOUNTER (OUTPATIENT)
Dept: GENERAL RADIOLOGY | Age: 69
Discharge: HOME | End: 2019-12-09
Payer: MEDICARE

## 2019-12-09 DIAGNOSIS — N40.1 ENLARGED PROSTATE WITH URINARY OBSTRUCTION: ICD-10-CM

## 2019-12-09 DIAGNOSIS — N13.8 ENLARGED PROSTATE WITH URINARY OBSTRUCTION: ICD-10-CM

## 2019-12-09 PROCEDURE — 74018 RADEX ABDOMEN 1 VIEW: CPT

## 2020-06-16 ENCOUNTER — OFFICE VISIT (OUTPATIENT)
Dept: ORTHOPEDIC SURGERY | Age: 70
End: 2020-06-16
Payer: MEDICARE

## 2020-06-16 VITALS — BODY MASS INDEX: 33.9 KG/M2 | HEIGHT: 67 IN | WEIGHT: 216 LBS

## 2020-06-16 PROCEDURE — G8427 DOCREV CUR MEDS BY ELIG CLIN: HCPCS | Performed by: ORTHOPAEDIC SURGERY

## 2020-06-16 PROCEDURE — G8417 CALC BMI ABV UP PARAM F/U: HCPCS | Performed by: ORTHOPAEDIC SURGERY

## 2020-06-16 PROCEDURE — 99202 OFFICE O/P NEW SF 15 MIN: CPT | Performed by: ORTHOPAEDIC SURGERY

## 2020-06-16 NOTE — PROGRESS NOTES
scapular protraction, no focal atrophy    Palpation: Only mild tenderness over the tuberosity    Range of Motion: He has very good scapulohumeral rhythm and roughly symmetrical and humeral arc of movement    Strength: He has very good isometric rotator cuff strength with no manual motor deficits    Special Tests: Some pain with Jobes and O'Briens active compression testing no lag sign or drop arm    Skin: There are no rashes, ulcerations or lesions. Gait: No assist device    Spine good cervical rotation    Additional Comments:         Radiology:     X-rays obtained reviewed in the office today include 4 views left shoulder. Notably he has some very mild grade 2 arthritic changes the glenohumeral articulation. He has a a type II downward sloping lateral acromion with outlet stenosis. Perhaps some subtle sclerosis at the greater tuberosity      Assessment : My clinical impression is likely some mild arthritis and degenerative rotator cuff pathology; partial-thickness or small tear well compensated      Office Procedures:  Orders Placed This Encounter   Procedures    XR SHOULDER LEFT (MIN 2 VIEWS)       Treatment Plan:  A detailed discussion of the nature of rotator cuff pathology was had with the patient. We outlined the fact that her cuff tears are very common in ageing population. We discussed some of the literature which has delineated the incidence of rotator cuff tears over time. The full spectrum of rotator cuff tears beginning with tendinopathy and extending to partial-thickness tears and full-thickness tears were notable. We also discussed available research which has shown good success rates with conservative treatment of rotator cuff pathology. Conservative supportive care with heat, ice, topicals, the judicious use of anti-inflammatories and physical therapy was outlined. The process of patient compensation through muscular adaptations and physical therapy was notable.     Finally, the

## 2020-06-19 ENCOUNTER — EVALUATION (OUTPATIENT)
Dept: PHYSICAL THERAPY | Age: 70
End: 2020-06-19
Payer: MEDICARE

## 2020-06-19 PROCEDURE — 97110 THERAPEUTIC EXERCISES: CPT | Performed by: PHYSICAL THERAPIST

## 2020-06-19 PROCEDURE — 97530 THERAPEUTIC ACTIVITIES: CPT | Performed by: PHYSICAL THERAPIST

## 2020-06-19 PROCEDURE — 97161 PT EVAL LOW COMPLEX 20 MIN: CPT | Performed by: PHYSICAL THERAPIST

## 2020-06-19 NOTE — PROGRESS NOTES
Patient history, allergies, meds reviewed. Medical chart reviewed. See intake form. Review Of Systems (ROS):  [x]Performed Review of systems (Integumentary, CardioPulmonary, Neurological) by intake and observation. Intake form has been scanned into medical record. Patient has been instructed to contact their primary care physician regarding ROS issues if not already being addressed at this time. Co-morbidities/Complexities (which will affect course of rehabilitation):   []None           Arthritic conditions   [x]Rheumatoid arthritis (M05.9)  [x]Osteoarthritis (M19.91)   Cardiovascular conditions   [x]Hypertension (I10)  []Hyperlipidemia (E78.5)  []Angina pectoris (I20)  []Atherosclerosis (I70)   Musculoskeletal conditions   []Disc pathology   []Congenital spine pathologies   []Prior surgical intervention  []Osteoporosis (M81.8)  []Osteopenia (M85.8)   Endocrine conditions   []Hypothyroid (E03.9)  []Hyperthyroid Gastrointestinal conditions   []Constipation (H36.21)   Metabolic conditions   []Morbid obesity (E66.01)  [x]Diabetes type 1(E10.65) or 2 (E11.65)   []Neuropathy (G60.9)     Pulmonary conditions   []Asthma (J45)  []Coughing   []COPD (J44.9)   Psychological Disorders  []Anxiety (F41.9)  []Depression (F32.9)   []Other:   []Other:          Barriers to/and or personal factors that will affect rehab potential:              [x]Age  []Sex              []Motivation/Lack of Motivation                        [x]Co-Morbidities              []Cognitive Function, education/learning barriers              []Environmental, home barriers              []profession/work barriers  [x]past PT/medical experience  []other:  Justification:      Falls Risk Assessment (30 days):   [x] Falls Risk assessed and no intervention required.   [] Falls Risk assessed and Patient requires intervention due to being higher risk   TUG score (>12s at risk):     [] Falls education provided, including        Pain  [x]  Pain diagram was supplements, mineral supplements, dietary supplements) was reviewed with the patient and documented in the medical chart. ()      ASSESSMENT:   Functional Impairments   []Noted spinal or UE joint hypomobility   []Noted spinal or UE joint hypermobility   []Decreased UE functional ROM   [x]Decreased UE functional strength   []Abnormal reflexes/sensation/myotomal/dermatomal deficits   [x]Decreased RC/scapular/core strength and neuromuscular control   []other:      Functional Activity Limitations (from functional questionnaire and intake)   [x]Reduced ability to tolerate prolonged functional positions   []Reduced ability or difficulty with changes of positions or transfers between positions   [x]Reduced ability to maintain good posture and demonstrate good body mechanics with sitting, bending, and lifting   [x] Reduced ability or tolerance with driving and/or computer work   [x]Reduced ability to sleep   []Reduced ability to perform lifting, reaching, carrying tasks   []Reduced ability to tolerate impact through UE   []Reduced ability to reach behind back   []Reduced ability to  or hold objects   []Reduced ability to throw or toss an object   []other:    Participation Restrictions   [x]Reduced participation in self care activities   []Reduced participation in home management activities   []Reduced participation in work activities   []Reduced participation in social activities. []Reduced participation in sport/recreation activities. Classification:   []Signs/symptoms consistent with post-surgical status including decreased ROM, strength and function.   []Signs/symptoms consistent with joint sprain/strain   []Signs/symptoms consistent with shoulder impingement   [x]Signs/symptoms consistent with shoulder/elbow/wrist tendinopathy   []Signs/symptoms consistent with Rotator cuff tear   []Signs/symptoms consistent with labral tear   []Signs/symptoms consistent with postural dysfunction    []Signs/symptoms

## 2020-06-19 NOTE — PROGRESS NOTES
proprioception  to assist with  scapular, scapulothoracic and UE control with self care, reaching, carrying, lifting, house/yardwork, driving/computer work. Therapeutic Activities:    [x] (14055 or 38688) Provided verbal/tactile cueing for activities related to improving balance, coordination, kinesthetic sense, posture, motor skill, proprioception and motor activation to allow for proper function of scapular, scapulothoracic and UE control with self care, carrying, lifting, driving/computer work. Home Exercise Program:    [x] (34925) Reviewed/Progressed HEP activities related to strengthening, flexibility, endurance, ROM of scapular, scapulothoracic and UE control with self care, reaching, carrying, lifting, house/yardwork, driving/computer work  [] (92446) Reviewed/Progressed HEP activities related to improving balance, coordination, kinesthetic sense, posture, motor skill, proprioception of scapular, scapulothoracic and UE control with self care, reaching, carrying, lifting, house/yardwork, driving/computer work      Manual Treatments:  PROM / STM / Oscillations-Mobs:  G-I, II, III, IV (PA's, Inf., Post.)  [] (15664) Provided manual therapy to mobilize soft tissue/joints of cervical/CT, scapular GHJ and UE for the purpose of modulating pain, promoting relaxation,  increasing ROM, reducing/eliminating soft tissue swelling/inflammation/restriction, improving soft tissue extensibility and allowing for proper ROM for normal function with self care, reaching, carrying, lifting, house/yardwork, driving/computer work    Modalities:  CP x10'   [] GAME READY (VASO)- for significant edema, swelling, pain control.     Charges:  Timed Code Treatment Minutes: 40   Total Treatment Minutes: 55      [x] EVAL (LOW) 05091 (typically 20 minutes face-to-face)  [] EVAL (MOD) 46777 (typically 30 minutes face-to-face)  [] EVAL (HIGH) 15767 (typically 45 minutes face-to-face)  [] RE-EVAL     [x] NG(94524) x  1   [] IONTO  [] NMR (35193) x     [] VASO  [] Manual (37233) x     [] Other:  [x] TA x 1     [] Mech Traction (86136)  [] ES(attended) (30044)      [] ES (un) (69548):         ASSESSMENT:  See eval      GOALS:  Patient stated goal: \"get rid of pain in the shoulder\"  [] Progressing: [] Met: [] Not Met: [] Adjusted    Therapist goals for Patient:   Short Term Goals: To be achieved in: 2 weeks  1. Independent in HEP and progression per patient tolerance, in order to prevent re-injury. [] Progressing: [] Met: [] Not Met: [] Adjusted  2. Patient will have a decrease in pain to facilitate improvement in movement, function, and ADLs as indicated by Functional Deficits. [] Progressing: [] Met: [] Not Met: [] Adjusted    Long Term Goals: To be achieved in: 4-6 weeks  1. Disability index score of 10-15% or less for the DASH to assist with reaching prior level of function. [] Progressing: [] Met: [] Not Met: [] Adjusted  2. Patient will demonstrate increased AROM to WNL to allow for proper joint functioning as indicated by patients Functional Deficits. [] Progressing: [] Met: [] Not Met: [] Adjusted  3. Patient will demonstrate an increase in Strength by 1/2 grade to allow for proper functional mobility as indicated by patients Functional Deficits. [] Progressing: [] Met: [] Not Met: [] Adjusted  4. Patient will return to normal functional activities without increased symptoms or restriction. [] Progressing: [] Met: [] Not Met: [] Adjusted  5. Able to sleep through the night painfree 4 out of 7 nights  [] Progressing: [] Met: [] Not Met: [] Adjusted       Overall Progression Towards Functional goals/ Treatment Progress Update:  [] Patient is progressing as expected towards functional goals listed. [] Progression is slowed due to complexities/Impairments listed. [] Progression has been slowed due to co-morbidities.   [x] Plan just implemented, too soon to assess goals progression <30days   [] Goals require adjustment due to lack of

## 2020-06-23 ENCOUNTER — TREATMENT (OUTPATIENT)
Dept: PHYSICAL THERAPY | Age: 70
End: 2020-06-23
Payer: MEDICARE

## 2020-06-23 PROCEDURE — 97112 NEUROMUSCULAR REEDUCATION: CPT | Performed by: PHYSICAL THERAPIST

## 2020-06-23 PROCEDURE — 97110 THERAPEUTIC EXERCISES: CPT | Performed by: PHYSICAL THERAPIST

## 2020-06-23 PROCEDURE — G8427 DOCREV CUR MEDS BY ELIG CLIN: HCPCS | Performed by: PHYSICAL THERAPIST

## 2020-06-23 PROCEDURE — 97530 THERAPEUTIC ACTIVITIES: CPT | Performed by: PHYSICAL THERAPIST

## 2020-06-23 NOTE — PROGRESS NOTES
symptoms or restriction. [] Progressing: [] Met: [] Not Met: [] Adjusted  5. Able to sleep through the night painfree 4 out of 7 nights  [] Progressing: [] Met: [] Not Met: [] Adjusted       Overall Progression Towards Functional goals/ Treatment Progress Update:  [] Patient is progressing as expected towards functional goals listed. [] Progression is slowed due to complexities/Impairments listed. [] Progression has been slowed due to co-morbidities. [x] Plan just implemented, too soon to assess goals progression <30days   [] Goals require adjustment due to lack of progress  [] Patient is not progressing as expected and requires additional follow up with physician  [] Other    Prognosis for POC: [x] Good [] Fair  [] Poor      Patient requires continued skilled intervention: [x] Yes  [] No    Treatment/Activity Tolerance:  [x] Patient able to complete treatment  [] Patient limited by fatigue  [] Patient limited by pain    [] Patient limited by other medical complications  [] Other:           PLAN: See eval.  Cont 1x/week, postural ed, RTC strengthening  [x] Continue per plan of care [] Alter current plan   [x] Plan of care initiated [] Hold pending MD visit [] Discharge      Electronically signed by:      Brice Horn, PT     [place] PT #[id number]      Note: If patient does not return for scheduled/ recommended follow up visits, this note will serve as a discharge from care along with most recent update on progress.

## 2020-07-13 ENCOUNTER — OFFICE VISIT (OUTPATIENT)
Dept: SURGERY | Age: 70
End: 2020-07-13
Payer: MEDICARE

## 2020-07-13 VITALS
BODY MASS INDEX: 32.91 KG/M2 | DIASTOLIC BLOOD PRESSURE: 72 MMHG | WEIGHT: 207 LBS | TEMPERATURE: 97.7 F | SYSTOLIC BLOOD PRESSURE: 124 MMHG

## 2020-07-13 PROCEDURE — 99203 OFFICE O/P NEW LOW 30 MIN: CPT | Performed by: SURGERY

## 2020-07-13 PROCEDURE — 1036F TOBACCO NON-USER: CPT | Performed by: SURGERY

## 2020-07-13 PROCEDURE — 1123F ACP DISCUSS/DSCN MKR DOCD: CPT | Performed by: SURGERY

## 2020-07-13 PROCEDURE — 3017F COLORECTAL CA SCREEN DOC REV: CPT | Performed by: SURGERY

## 2020-07-13 PROCEDURE — 4040F PNEUMOC VAC/ADMIN/RCVD: CPT | Performed by: SURGERY

## 2020-07-13 PROCEDURE — G8417 CALC BMI ABV UP PARAM F/U: HCPCS | Performed by: SURGERY

## 2020-07-13 PROCEDURE — G8427 DOCREV CUR MEDS BY ELIG CLIN: HCPCS | Performed by: SURGERY

## 2020-07-13 RX ORDER — HYDROCHLOROTHIAZIDE 12.5 MG/1
12.5 CAPSULE, GELATIN COATED ORAL DAILY
COMMUNITY
End: 2020-11-18 | Stop reason: ALTCHOICE

## 2020-07-13 NOTE — PROGRESS NOTES
New Patient Via Dave Gu MD    [address], [address] [facility]  [place], [facility]  [phone]    Robinson Cordoba   YOB: 1950    Date of Visit:  7/13/2020    Natalia Jane MD    Chief Complaint: Hernia    HPI: Patient presents for evaluation of an abdominal wall hernia. He states he has had this for a few years. Lately it has been bothering him more. It will become more prominent and cause nausea and anorexia. He is only been taking very little solid food lately. He has been able to drink okay. He has had nausea but no vomiting. The pain is a dull ache at baseline and occasionally a sharp stabbing crampy pain when he gets severe. He will occasionally get distended and have to lay down, which alleviates his symptoms. He has had 4 prior surgeries to this area starting as early as 1988.     No Known Allergies  Outpatient Medications Marked as Taking for the 7/13/20 encounter (Office Visit) with Dorene Espinosa MD   Medication Sig Dispense Refill    hydroCHLOROthiazide (MICROZIDE) 12.5 MG capsule Take 12.5 mg by mouth daily      losartan (COZAAR) 50 MG tablet Take 50 mg by mouth Take half pill daily      tamsulosin (FLOMAX) 0.4 MG capsule Take 0.4 mg by mouth daily      Multiple Vitamins-Minerals (THERAPEUTIC MULTIVITAMIN-MINERALS) tablet Take 1 tablet by mouth daily      b complex vitamins capsule Take 1 capsule by mouth daily      vitamin D 1000 units CAPS Take by mouth      latanoprost (XALATAN) 0.005 % ophthalmic solution 1 drop nightly      metFORMIN (GLUCOPHAGE) 500 MG tablet Take 2 tablets by mouth 2 times daily (with meals) 120 tablet 3    melatonin 5 MG TABS tablet Take 5 mg by mouth nightly as needed      glipiZIDE (GLUCOTROL) 10 MG tablet Take 10 mg by mouth 4 times daily      cyclobenzaprine (FLEXERIL) 10 MG tablet Take 10 mg by mouth 3 times daily as needed for Muscle spasms      budesonide-formoterol (SYMBICORT) 160-4.5 MCG/ACT AERO Inhale 2 puffs into the lungs 2 times daily 1 Inhaler 3    docusate sodium (COLACE) 100 MG capsule Take 1 capsule by mouth 2 times daily 60 capsule 0       Past Medical History:   Diagnosis Date    Acute renal failure (ARF) (HCC)     Asthma     BPH (benign prostatic hyperplasia)     Diabetes mellitus, type 2 ([address]     Hypertension      Past Surgical History:   Procedure Laterality Date    APPENDECTOMY      COLONOSCOPY      CYSTOSCOPY      CYSTOSCOPY  07/26/2017    cysto, bilateral stent exchange, left retrograde pyelogram    HERNIA REPAIR      x 4    OTHER SURGICAL HISTORY      bladder diverticuli excision    TURP       Family History   Problem Relation Age of Onset    Dementia Mother     Stroke Father      Social History     Socioeconomic History    Marital status:      Spouse name: Not on file    Number of children: 3    Years of education: Not on file    Highest education level: Not on file   Occupational History    Occupation: retired   Social Needs    Financial resource strain: Not on file    Food insecurity     Worry: Not on file     Inability: Not on file   [employer] needs     Medical: Not on file     Non-medical: Not on file   Tobacco Use    Smoking status: Never Smoker    Smokeless tobacco: Never Used   Substance and Sexual Activity    Alcohol use: Yes     Comment: occasionally    Drug use: Not on file    Sexual activity: Not on file   Lifestyle    Physical activity     Days per week: Not on file     Minutes per session: Not on file    Stress: Not on file   Relationships    Social connections     Talks on phone: Not on file     Gets together: Not on file     Attends Latter day service: Not on file     Active member of club or organization: Not on file     Attends meetings of clubs or organizations: Not on file     Relationship status: Not on file    Intimate partner violence     Fear of current or ex partner: Not on file     Emotionally abused: Not on file     Physically abused: Not on file     Forced sexual activity: Not on file   Other Topics Concern    Not on file   Social History Narrative    Not on file          Vitals:    07/13/20 0957   BP: 124/72   Site: Left Upper Arm   Position: Sitting   Cuff Size: Large Adult   Temp: 97.7 °F (36.5 °C)   TempSrc: Temporal   Weight: 207 lb (93.9 kg)     Body mass index is 32.91 kg/m². Wt Readings from Last 3 Encounters:   07/13/20 207 lb (93.9 kg)   06/16/20 216 lb (98 kg)   05/10/18 219 lb (99.3 kg)     BP Readings from Last 3 Encounters:   07/13/20 124/72   05/10/18 120/70   01/23/18 126/68        ROS:  As per HPI, otherwise reviewed ×10 systems and negative    PE:  Constitutional:  Well developed, well nourished, no acute distress, non-toxic appearance   Eyes:  PERRL, conjunctiva normal   HENT:  Atraumatic, external ears normal, nose normal. Neck- normal range of motion, no tenderness, supple   Respiratory:  No respiratory distress, normal breath sounds, no rales, no wheezing   Cardiovascular:  Normal rate, normal rhythm  GI: Bowel sounds positive, soft, nontender. In the midline supraumbilical area, he has a hernia with multiple fascial defects. Bowel was protruding, but with some manipulation I was able to reduce this. In addition, he has a prominent diastases rectus  :  No costovertebral angle tenderness   Integument:  Well hydrated, no rash   Lymphatic:  No lymphadenopathy noted   Neurologic:  Alert & oriented x 3, no focal deficits noted   Psychiatric:  Speech and behavior appropriate       DATA:  Radiology Review: CT imaging from 2018 was reviewed and does indeed show a bowel containing hernia      Assessment:  1. Recurrent ventral incisional hernia        Plan: As he is currently symptomatic, I think this definitely needs to be repaired.   Given the multiple fascial defects as well as four prior surgeries, he will be referred to the complex abdominal wall reconstruction team at the [facility] for more definitive evaluation and treatment.

## 2020-07-15 ENCOUNTER — INITIAL CONSULT (OUTPATIENT)
Dept: BARIATRICS/WEIGHT MGMT | Age: 70
End: 2020-07-15
Payer: MEDICARE

## 2020-07-15 VITALS
SYSTOLIC BLOOD PRESSURE: 126 MMHG | DIASTOLIC BLOOD PRESSURE: 89 MMHG | TEMPERATURE: 97.5 F | HEIGHT: 66 IN | HEART RATE: 72 BPM | WEIGHT: 207.2 LBS | BODY MASS INDEX: 33.3 KG/M2

## 2020-07-15 PROCEDURE — G8417 CALC BMI ABV UP PARAM F/U: HCPCS | Performed by: SURGERY

## 2020-07-15 PROCEDURE — G8427 DOCREV CUR MEDS BY ELIG CLIN: HCPCS | Performed by: SURGERY

## 2020-07-15 PROCEDURE — 3017F COLORECTAL CA SCREEN DOC REV: CPT | Performed by: SURGERY

## 2020-07-15 PROCEDURE — 4040F PNEUMOC VAC/ADMIN/RCVD: CPT | Performed by: SURGERY

## 2020-07-15 PROCEDURE — 99214 OFFICE O/P EST MOD 30 MIN: CPT | Performed by: SURGERY

## 2020-07-15 PROCEDURE — 1123F ACP DISCUSS/DSCN MKR DOCD: CPT | Performed by: SURGERY

## 2020-07-15 PROCEDURE — 1036F TOBACCO NON-USER: CPT | Performed by: SURGERY

## 2020-07-15 RX ORDER — OMEPRAZOLE 40 MG/1
40 CAPSULE, DELAYED RELEASE ORAL DAILY
Qty: 30 CAPSULE | Refills: 3 | Status: SHIPPED | OUTPATIENT
Start: 2020-07-15

## 2020-07-19 NOTE — PROGRESS NOTES
[facility]) Physicians   General & Laparoscopic Surgery  [employer]       HPI:    Cecille Cole is very pleasant 78 y.o. male who is referred by Dr Katja Delgadillo for recurrent incisional hernia    Patient has had 4 previous repairs with and without mesh. Last surgery was a robotic bladder diverticulum surgery that required 2 hours of lysis of adhesions    No obstructive symptoms per patient (Denies N/V/F/C and regular BM's)        Past Medical History:   Diagnosis Date    [facility] [address]     Asthma     BPH (benign prostatic hyperplasia)     Diabetes mellitus, type 2 ([facility] [address]     Hypertension      Past Surgical History:   Procedure Laterality Date    APPENDECTOMY      COLONOSCOPY      CYSTOSCOPY      CYSTOSCOPY  07/26/2017    cysto, bilateral stent exchange, left retrograde pyelogram    HERNIA REPAIR      x 4    OTHER SURGICAL HISTORY      bladder diverticuli excision    TURP       Family History   Problem Relation Age of Onset    Dementia Mother     Stroke Father      Social History     Tobacco Use    Smoking status: Never Smoker    Smokeless tobacco: Never Used   Substance Use Topics    Alcohol use: Yes     Comment: occasionally     I counseled the patient on the importance of not smoking and risks of ETOH. No Known Allergies  Vitals:    07/15/20 0856   BP: 126/89   Pulse: 72   Temp: 97.5 °F (36.4 °C)   Weight: 207 lb 3.2 oz (94 kg)   Height: 5' 6\" (1.676 m)       Body mass index is 33.44 kg/m².       Current Outpatient Medications:     Acetaminophen (TYLENOL ARTHRITIS PAIN PO), Take by mouth, Disp: , Rfl:     omeprazole (PRILOSEC) 40 MG delayed release capsule, Take 1 capsule by mouth daily, Disp: 30 capsule, Rfl: 3    hydroCHLOROthiazide (MICROZIDE) 12.5 MG capsule, Take 12.5 mg by mouth daily, Disp: , Rfl:     losartan (COZAAR) 50 MG tablet, Take 50 mg by mouth Take half pill daily, Disp: , Rfl:     tamsulosin (FLOMAX) 0.4 MG capsule, Take 0.4 mg by mouth daily, Disp: , Rfl:     Multiple Vitamins-Minerals (THERAPEUTIC MULTIVITAMIN-MINERALS) tablet, Take 1 tablet by mouth daily, Disp: , Rfl:     b complex vitamins capsule, Take 1 capsule by mouth daily, Disp: , Rfl:     vitamin D 1000 units CAPS, Take by mouth, Disp: , Rfl:     latanoprost (XALATAN) 0.005 % ophthalmic solution, 1 drop nightly, Disp: , Rfl:     metFORMIN (GLUCOPHAGE) 500 MG tablet, Take 2 tablets by mouth 2 times daily (with meals), Disp: 120 tablet, Rfl: 3    melatonin 5 MG TABS tablet, Take 5 mg by mouth nightly as needed, Disp: , Rfl:     glipiZIDE (GLUCOTROL) 10 MG tablet, Take 10 mg by mouth 4 times daily, Disp: , Rfl:     cyclobenzaprine (FLEXERIL) 10 MG tablet, Take 10 mg by mouth 3 times daily as needed for Muscle spasms, Disp: , Rfl:     budesonide-formoterol (SYMBICORT) 160-4.5 MCG/ACT AERO, Inhale 2 puffs into the lungs 2 times daily, Disp: 1 Inhaler, Rfl: 3    albuterol sulfate HFA (PROAIR HFA) 108 (90 Base) MCG/ACT inhaler, Inhale 2 puffs into the lungs every 6 hours as needed for Wheezing, Disp: 1 Inhaler, Rfl: 3    docusate sodium (COLACE) 100 MG capsule, Take 1 capsule by mouth 2 times daily, Disp: 60 capsule, Rfl: 0      Review of Systems - History obtained from the patient  General ROS: negative  Psychological ROS: negative  Ophthalmic ROS: negative  Neurological ROS: negative  ENT ROS: negative  Allergy and Immunology ROS: negative  Hematological and Lymphatic ROS: negative  Endocrine ROS: negative  Respiratory ROS: negative  Cardiovascular ROS: negative  Gastrointestinal ROS: abdominal pain  Genito-Urinary ROS: negative  Musculoskeletal ROS: negative   Skin ROS: negative    Physical Exam   Constitutional: Patient is oriented to person, place, and time. Vital signs are normal. Patient  appears well-developed and well-nourished. Patient  is active and cooperative. Non-toxic appearance. No distress. HENT:   Head: Normocephalic and atraumatic. Head is without laceration.    Right present. Left: No supraclavicular adenopathy present. Neurological: Patient is alert and oriented to person, place, and time. Patient has normal strength. Coordination and gait normal. GCS eye subscore is 4. GCS verbal subscore is 5. GCS motor subscore is 6. Skin: Skin is warm and dry. No abrasion and no rash noted. Patient  is not diaphoretic. No cyanosis or erythema. Psychiatric: Patient has a normal mood and affect. speech is normal and behavior is normal. Cognition and memory are normal.         Data  2018 CT scan personally reviewed and discussed with patient        A/P    1- No heavy lifting or straining. 2- Proceed to the ED and call our office if you ever had increased pain at the hernia site, redness, unable to push it back in, unable to pass gas/stool or vomiting.    3- Plan for recurrent incarcerated incisional hernia repair with mesh with abdominal wall reconstruction and bilateral component separation  4- Will need to get below 190 before surgery for successful repair  5- CT A/P to evaluate hernia to plan open versus robotic repair  6- Follow up in 2 months    Jackie Khanna

## 2020-08-05 ENCOUNTER — HOSPITAL ENCOUNTER (OUTPATIENT)
Dept: CT IMAGING | Age: 70
Discharge: HOME | End: 2020-08-05
Payer: MEDICARE

## 2020-08-05 PROCEDURE — 74176 CT ABD & PELVIS W/O CONTRAST: CPT

## 2020-08-13 ENCOUNTER — HOSPITAL ENCOUNTER (OUTPATIENT)
Age: 70
Discharge: HOME | End: 2020-08-13
Payer: MEDICARE

## 2020-08-13 PROCEDURE — 83550 IRON BINDING TEST: CPT

## 2020-08-13 PROCEDURE — 36415 COLL VENOUS BLD VENIPUNCTURE: CPT

## 2020-08-13 PROCEDURE — 80053 COMPREHEN METABOLIC PANEL: CPT

## 2020-08-13 PROCEDURE — 85025 COMPLETE CBC W/AUTO DIFF WBC: CPT

## 2020-08-13 PROCEDURE — 83036 HEMOGLOBIN GLYCOSYLATED A1C: CPT

## 2020-08-13 PROCEDURE — 83540 ASSAY OF IRON: CPT

## 2020-08-13 PROCEDURE — 82746 ASSAY OF FOLIC ACID SERUM: CPT

## 2020-08-13 PROCEDURE — 82306 VITAMIN D 25 HYDROXY: CPT

## 2020-08-13 PROCEDURE — 82607 VITAMIN B-12: CPT

## 2020-08-13 PROCEDURE — 84443 ASSAY THYROID STIM HORMONE: CPT

## 2020-08-13 PROCEDURE — 80061 LIPID PANEL: CPT

## 2020-08-14 LAB
A/G RATIO: 1 (ref 1.1–2.2)
ALBUMIN SERPL-MCNC: 4 G/DL (ref 3.4–5)
ALP BLD-CCNC: 79 U/L (ref 40–129)
ALT SERPL-CCNC: 20 U/L (ref 10–40)
ANION GAP SERPL CALCULATED.3IONS-SCNC: 13 MMOL/L (ref 3–16)
AST SERPL-CCNC: 22 U/L (ref 15–37)
BASOPHILS ABSOLUTE: 0.1 K/UL (ref 0–0.2)
BASOPHILS RELATIVE PERCENT: 2.2 %
BILIRUB SERPL-MCNC: 0.4 MG/DL (ref 0–1)
BUN BLDV-MCNC: 25 MG/DL (ref 7–20)
CALCIUM SERPL-MCNC: 9.9 MG/DL (ref 8.3–10.6)
CHLORIDE BLD-SCNC: 97 MMOL/L (ref 99–110)
CHOLESTEROL, TOTAL: 158 MG/DL (ref 0–199)
CO2: 28 MMOL/L (ref 21–32)
CREAT SERPL-MCNC: 1.2 MG/DL (ref 0.8–1.3)
EOSINOPHILS ABSOLUTE: 0.3 K/UL (ref 0–0.6)
EOSINOPHILS RELATIVE PERCENT: 5.3 %
ESTIMATED AVERAGE GLUCOSE: 180 MG/DL
GFR AFRICAN AMERICAN: >60
GFR NON-AFRICAN AMERICAN: >60
GLOBULIN: 3.9 G/DL
GLUCOSE BLD-MCNC: 139 MG/DL (ref 70–99)
HBA1C MFR BLD: 7.9 %
HCT VFR BLD CALC: 48 % (ref 40.5–52.5)
HDLC SERPL-MCNC: 51 MG/DL (ref 40–60)
HEMOGLOBIN: 16 G/DL (ref 13.5–17.5)
IRON SATURATION: 42 % (ref 20–50)
IRON: 121 UG/DL (ref 59–158)
LDL CHOLESTEROL CALCULATED: 64 MG/DL
LYMPHOCYTES ABSOLUTE: 1.9 K/UL (ref 1–5.1)
LYMPHOCYTES RELATIVE PERCENT: 33.3 %
MCH RBC QN AUTO: 29.7 PG (ref 26–34)
MCHC RBC AUTO-ENTMCNC: 33.2 G/DL (ref 31–36)
MCV RBC AUTO: 89.2 FL (ref 80–100)
MONOCYTES ABSOLUTE: 0.7 K/UL (ref 0–1.3)
MONOCYTES RELATIVE PERCENT: 11.7 %
NEUTROPHILS ABSOLUTE: 2.7 K/UL (ref 1.7–7.7)
NEUTROPHILS RELATIVE PERCENT: 47.5 %
PDW BLD-RTO: 13.9 % (ref 12.4–15.4)
PLATELET # BLD: 321 K/UL (ref 135–450)
PMV BLD AUTO: 7.8 FL (ref 5–10.5)
POTASSIUM SERPL-SCNC: 4.5 MMOL/L (ref 3.5–5.1)
RBC # BLD: 5.38 M/UL (ref 4.2–5.9)
SODIUM BLD-SCNC: 138 MMOL/L (ref 136–145)
TOTAL IRON BINDING CAPACITY: 288 UG/DL (ref 260–445)
TOTAL PROTEIN: 7.9 G/DL (ref 6.4–8.2)
TRIGL SERPL-MCNC: 214 MG/DL (ref 0–150)
TSH REFLEX: 2.12 UIU/ML (ref 0.27–4.2)
VLDLC SERPL CALC-MCNC: 43 MG/DL
WBC # BLD: 5.6 K/UL (ref 4–11)

## 2020-08-16 LAB — VITAMIN D 25-HYDROXY: 61.4 NG/ML

## 2020-08-18 LAB
FOLATE: >20 NG/ML (ref 4.78–24.2)
VITAMIN B-12: 302 PG/ML (ref 211–911)

## 2020-08-26 ENCOUNTER — OFFICE VISIT (OUTPATIENT)
Dept: BARIATRICS/WEIGHT MGMT | Age: 70
End: 2020-08-26
Payer: MEDICARE

## 2020-08-26 VITALS
BODY MASS INDEX: 33.75 KG/M2 | TEMPERATURE: 97.3 F | HEART RATE: 63 BPM | DIASTOLIC BLOOD PRESSURE: 80 MMHG | SYSTOLIC BLOOD PRESSURE: 140 MMHG | HEIGHT: 66 IN | WEIGHT: 210 LBS

## 2020-08-26 PROCEDURE — 1123F ACP DISCUSS/DSCN MKR DOCD: CPT | Performed by: SURGERY

## 2020-08-26 PROCEDURE — 99214 OFFICE O/P EST MOD 30 MIN: CPT | Performed by: SURGERY

## 2020-08-26 PROCEDURE — 3017F COLORECTAL CA SCREEN DOC REV: CPT | Performed by: SURGERY

## 2020-08-26 PROCEDURE — G8428 CUR MEDS NOT DOCUMENT: HCPCS | Performed by: SURGERY

## 2020-08-26 PROCEDURE — G8417 CALC BMI ABV UP PARAM F/U: HCPCS | Performed by: SURGERY

## 2020-08-26 PROCEDURE — 1036F TOBACCO NON-USER: CPT | Performed by: SURGERY

## 2020-08-26 PROCEDURE — 4040F PNEUMOC VAC/ADMIN/RCVD: CPT | Performed by: SURGERY

## 2020-08-26 NOTE — PROGRESS NOTES
[facility]) Physicians   General & Laparoscopic Surgery  [employer]       HPI:    Meng Londono is very pleasant 78 y.o. male who is here for f/u regarding hernia    No obstructive symptoms      Past Medical History:   Diagnosis Date    [facility] ([facility]) [facility] [address]     Asthma     BPH (benign prostatic hyperplasia)     Diabetes mellitus, type 2 ([facility] [address]     Hypertension      Past Surgical History:   Procedure Laterality Date    APPENDECTOMY      COLONOSCOPY      CYSTOSCOPY      CYSTOSCOPY  07/26/2017    cysto, bilateral stent exchange, left retrograde pyelogram    HERNIA REPAIR      x 4    OTHER SURGICAL HISTORY      bladder diverticuli excision    TURP       Family History   Problem Relation Age of Onset    Dementia Mother     Stroke Father      Social History     Tobacco Use    Smoking status: Never Smoker    Smokeless tobacco: Never Used   Substance Use Topics    Alcohol use: Yes     Comment: occasionally     I counseled the patient on the importance of not smoking and risks of ETOH. No Known Allergies  Vitals:    08/26/20 0853   BP: (!) 140/80   Pulse: 63   Temp: 97.3 °F (36.3 °C)   Weight: 210 lb (95.3 kg)   Height: 5' 6\" (1.676 m)       Body mass index is 33.89 kg/m².       Current Outpatient Medications:     Acetaminophen (TYLENOL ARTHRITIS PAIN PO), Take by mouth, Disp: , Rfl:     omeprazole (PRILOSEC) 40 MG delayed release capsule, Take 1 capsule by mouth daily, Disp: 30 capsule, Rfl: 3    hydroCHLOROthiazide (MICROZIDE) 12.5 MG capsule, Take 12.5 mg by mouth daily, Disp: , Rfl:     losartan (COZAAR) 50 MG tablet, Take 50 mg by mouth Take half pill daily, Disp: , Rfl:     tamsulosin (FLOMAX) 0.4 MG capsule, Take 0.4 mg by mouth daily, Disp: , Rfl:     Multiple Vitamins-Minerals (THERAPEUTIC MULTIVITAMIN-MINERALS) tablet, Take 1 tablet by mouth daily, Disp: , Rfl:     b complex vitamins capsule, Take 1 capsule by mouth daily, Disp: , Rfl:     vitamin D 1000 units CAPS, Take by mouth, Disp: , Rfl:     latanoprost (XALATAN) 0.005 % ophthalmic solution, 1 drop nightly, Disp: , Rfl:     metFORMIN (GLUCOPHAGE) 500 MG tablet, Take 2 tablets by mouth 2 times daily (with meals), Disp: 120 tablet, Rfl: 3    melatonin 5 MG TABS tablet, Take 5 mg by mouth nightly as needed, Disp: , Rfl:     glipiZIDE (GLUCOTROL) 10 MG tablet, Take 10 mg by mouth 4 times daily, Disp: , Rfl:     cyclobenzaprine (FLEXERIL) 10 MG tablet, Take 10 mg by mouth 3 times daily as needed for Muscle spasms, Disp: , Rfl:     budesonide-formoterol (SYMBICORT) 160-4.5 MCG/ACT AERO, Inhale 2 puffs into the lungs 2 times daily, Disp: 1 Inhaler, Rfl: 3    albuterol sulfate HFA (PROAIR HFA) 108 (90 Base) MCG/ACT inhaler, Inhale 2 puffs into the lungs every 6 hours as needed for Wheezing, Disp: 1 Inhaler, Rfl: 3    docusate sodium (COLACE) 100 MG capsule, Take 1 capsule by mouth 2 times daily, Disp: 60 capsule, Rfl: 0      Review of Systems - History obtained from the patient  General ROS: negative  Psychological ROS: negative  Ophthalmic ROS: negative  Respiratory ROS: negative  Cardiovascular ROS: negative  Gastrointestinal ROS: abdominal pain  Genito-Urinary ROS: negative  Musculoskeletal ROS: negative   Skin ROS: negative    Physical Exam   Constitutional: Patient is oriented to person, place, and time. Vital signs are normal. Patient  appears well-developed and well-nourished. Patient  is active and cooperative. Non-toxic appearance. No distress. Neck: Trachea normal and normal range of motion. Neck supple. No JVD present. No tracheal tenderness present. Carotid bruit is not present. No rigidity. No tracheal deviation and no edema present. No thyromegaly present. Cardiovascular: Normal rate, regular rhythm, normal heart sounds, intact distal pulses and normal pulses. Pulmonary/Chest: Effort normal and breath sounds normal. No stridor. No respiratory distress. Patient  has no wheezes.  Patient has no rales. Patient exhibits no tenderness and no crepitus. Abdominal: Soft. Normal appearance and bowel sounds are normal. Patient exhibits no distension, no abdominal bruit, no ascites and no mass. There is no hepatosplenomegaly. There is no tenderness. There is no rigidity, no rebound, no guarding and no CVA tenderness. Hernia confirmed in the ventral area, just to the left and above of the umbilicus   Musculoskeletal: Normal range of motion. Patient exhibits no edema or tenderness. Lymphadenopathy:        Head (right side): No submental, no submandibular, no preauricular, no posterior auricular and no occipital adenopathy present. Head (left side): No submental, no submandibular, no preauricular, no posterior auricular and no occipital adenopathy present. Patient  has no cervical adenopathy. Right: No supraclavicular adenopathy present. Left: No supraclavicular adenopathy present. Neurological: Patient is alert and oriented to person, place, and time. Patient has normal strength. Coordination and gait normal. GCS eye subscore is 4. GCS verbal subscore is 5. GCS motor subscore is 6. Skin: Skin is warm and dry. No abrasion and no rash noted. Patient  is not diaphoretic. No cyanosis or erythema. Psychiatric: Patient has a normal mood and affect. speech is normal and behavior is normal. Cognition and memory are normal.         Data  CT scan personally reviewed and discussed with patient and wife      A/P    1- No heavy lifting or straining. 2- Proceed to the ED and call our office if you ever had increased pain at the hernia site, redness, unable to push it back in, unable to pass gas/stool or vomiting. 3- Pre-operative work up ordered for you(labs/x-rays/EKG). 4- Stop taking Blood thinners, Aspirin , Advil/Aleve/ Ibuprofen one week before surgery.    5- F/U with PCP for pre-op Medical Clearance, and cardiology for cardiac clearance  6- Plan for Robotic incarcerated incisional hernia repair with mesh possible open with bilateral posterior component separation  7- Discussed importance of weight loss for long term recurrence risk    Ricarda Medrano

## 2020-09-24 ENCOUNTER — OFFICE VISIT (OUTPATIENT)
Dept: INTERNAL MEDICINE CLINIC | Age: 70
End: 2020-09-24
Payer: MEDICARE

## 2020-09-24 VITALS
WEIGHT: 208 LBS | TEMPERATURE: 97.5 F | DIASTOLIC BLOOD PRESSURE: 68 MMHG | HEART RATE: 68 BPM | OXYGEN SATURATION: 98 % | BODY MASS INDEX: 33.57 KG/M2 | SYSTOLIC BLOOD PRESSURE: 122 MMHG

## 2020-09-24 PROBLEM — K43.9 ABDOMINAL WALL HERNIA: Status: ACTIVE | Noted: 2020-09-24

## 2020-09-24 LAB — DIABETIC RETINOPATHY: NEGATIVE

## 2020-09-24 PROCEDURE — 93000 ELECTROCARDIOGRAM COMPLETE: CPT | Performed by: INTERNAL MEDICINE

## 2020-09-24 PROCEDURE — G8427 DOCREV CUR MEDS BY ELIG CLIN: HCPCS | Performed by: INTERNAL MEDICINE

## 2020-09-24 PROCEDURE — 3288F FALL RISK ASSESSMENT DOCD: CPT | Performed by: INTERNAL MEDICINE

## 2020-09-24 PROCEDURE — G8510 SCR DEP NEG, NO PLAN REQD: HCPCS | Performed by: INTERNAL MEDICINE

## 2020-09-24 PROCEDURE — 1123F ACP DISCUSS/DSCN MKR DOCD: CPT | Performed by: INTERNAL MEDICINE

## 2020-09-24 PROCEDURE — 99214 OFFICE O/P EST MOD 30 MIN: CPT | Performed by: INTERNAL MEDICINE

## 2020-09-24 PROCEDURE — G8417 CALC BMI ABV UP PARAM F/U: HCPCS | Performed by: INTERNAL MEDICINE

## 2020-09-24 PROCEDURE — 4040F PNEUMOC VAC/ADMIN/RCVD: CPT | Performed by: INTERNAL MEDICINE

## 2020-09-24 PROCEDURE — 1036F TOBACCO NON-USER: CPT | Performed by: INTERNAL MEDICINE

## 2020-09-24 PROCEDURE — 3051F HG A1C>EQUAL 7.0%<8.0%: CPT | Performed by: INTERNAL MEDICINE

## 2020-09-24 PROCEDURE — 3017F COLORECTAL CA SCREEN DOC REV: CPT | Performed by: INTERNAL MEDICINE

## 2020-09-24 PROCEDURE — 2022F DILAT RTA XM EVC RTNOPTHY: CPT | Performed by: INTERNAL MEDICINE

## 2020-09-24 ASSESSMENT — PATIENT HEALTH QUESTIONNAIRE - PHQ9
DEPRESSION UNABLE TO ASSESS: FUNCTIONAL CAPACITY MOTIVATION LIMITS ACCURACY
SUM OF ALL RESPONSES TO PHQ9 QUESTIONS 1 & 2: 0
1. LITTLE INTEREST OR PLEASURE IN DOING THINGS: 0
2. FEELING DOWN, DEPRESSED OR HOPELESS: 0
SUM OF ALL RESPONSES TO PHQ QUESTIONS 1-9: 0
SUM OF ALL RESPONSES TO PHQ QUESTIONS 1-9: 0

## 2020-09-24 ASSESSMENT — ENCOUNTER SYMPTOMS
RESPIRATORY NEGATIVE: 1
CHEST TIGHTNESS: 0
WHEEZING: 0
SHORTNESS OF BREATH: 0
GASTROINTESTINAL NEGATIVE: 1

## 2020-09-24 NOTE — ASSESSMENT & PLAN NOTE
Sugar great  Continue present treatment  Check labs  Hold Metformin and Glipizide morning of surgery  Monitor blood sugar and cover with SS insulin

## 2020-09-24 NOTE — PROGRESS NOTES
1 capsule by mouth daily      vitamin D 1000 units CAPS Take by mouth      latanoprost (XALATAN) 0.005 % ophthalmic solution 1 drop nightly      metFORMIN (GLUCOPHAGE) 500 MG tablet Take 2 tablets by mouth 2 times daily (with meals) 120 tablet 3    melatonin 5 MG TABS tablet Take 5 mg by mouth nightly as needed      glipiZIDE (GLUCOTROL) 10 MG tablet Take 10 mg by mouth 4 times daily      cyclobenzaprine (FLEXERIL) 10 MG tablet Take 10 mg by mouth 3 times daily as needed for Muscle spasms      budesonide-formoterol (SYMBICORT) 160-4.5 MCG/ACT AERO Inhale 2 puffs into the lungs 2 times daily 1 Inhaler 3    albuterol sulfate HFA (PROAIR HFA) 108 (90 Base) MCG/ACT inhaler Inhale 2 puffs into the lungs every 6 hours as needed for Wheezing 1 Inhaler 3    docusate sodium (COLACE) 100 MG capsule Take 1 capsule by mouth 2 times daily 60 capsule 0     No current facility-administered medications for this visit. No Known Allergies    Social History     Tobacco Use    Smoking status: Never Smoker    Smokeless tobacco: Never Used   Substance Use Topics    Alcohol use: Yes     Comment: occasionally    Drug use: Not on file        Family History   Problem Relation Age of Onset    Dementia Mother     Stroke Father             Review of Systems   Constitutional: Negative. HENT: Negative. Respiratory: Negative. Negative for chest tightness, shortness of breath and wheezing. Cardiovascular: Negative. Negative for chest pain, palpitations and leg swelling. Gastrointestinal: Negative. Genitourinary: Negative. Musculoskeletal: Negative for arthralgias and myalgias. Neurological: Negative. Objective:   Physical Exam  Constitutional:       Appearance: He is well-developed. HENT:      Head: Normocephalic. Nose: Nose normal.      Mouth/Throat:      Lips: Pink. Pharynx: Oropharynx is clear. Neck:      Musculoskeletal: No edema. Vascular: No carotid bruit or JVD. Trachea: Trachea normal.   Cardiovascular:      Rate and Rhythm: Normal rate and regular rhythm. Heart sounds: S1 normal and S2 normal. No murmur. Pulmonary:      Effort: Pulmonary effort is normal.      Breath sounds: Normal breath sounds. No wheezing, rhonchi or rales. Musculoskeletal: Normal range of motion. Neurological:      General: No focal deficit present. Mental Status: He is oriented to person, place, and time. Cranial Nerves: Cranial nerves are intact. Sensory: Sensation is intact. Motor: Motor function is intact. Coordination: Coordination is intact. Deep Tendon Reflexes: Reflexes are normal and symmetric. Assessment:      See Problem List assessment and plan       Plan:       Abdominal wall hernia  Patient will do well with planned procedure  Continue Symbicort daily  Albuterol aerosol treatment en route to OR (in pre-op) and then in recovery room as needed. Hold Metformin or Glypizide morning or surgery. Monitor blood sugars shanice-operatively and cover with sliding scale insulin  Hold BP medications morning of surgery  Monitor BP shanice-operatively  Tylenol prn  Further orders per surgeon      Hypertension  BP stable  Hold Hydrochlorothiazide and Cozaar morning of surgery  Monitor BP    Diabetes mellitus, type 2 (HCC)  Sugar great  Continue present treatment  Check labs  Hold Metformin and Glipizide morning of surgery  Monitor blood sugar and cover with SS insulin    Asthma  Stable  Continue Symbicort  Albuterol en route to OR and then in recovery room prn              Patient engaged in shared decision making. Information given to evaluate options of treatment, understand what is needed and discuss importance of following plan.

## 2020-10-02 ENCOUNTER — OFFICE VISIT (OUTPATIENT)
Dept: CARDIOLOGY CLINIC | Age: 70
End: 2020-10-02
Payer: MEDICARE

## 2020-10-02 VITALS
DIASTOLIC BLOOD PRESSURE: 60 MMHG | OXYGEN SATURATION: 97 % | SYSTOLIC BLOOD PRESSURE: 92 MMHG | TEMPERATURE: 98 F | HEART RATE: 64 BPM | WEIGHT: 203.2 LBS | BODY MASS INDEX: 32.8 KG/M2

## 2020-10-02 PROCEDURE — 4040F PNEUMOC VAC/ADMIN/RCVD: CPT | Performed by: INTERNAL MEDICINE

## 2020-10-02 PROCEDURE — 99204 OFFICE O/P NEW MOD 45 MIN: CPT | Performed by: INTERNAL MEDICINE

## 2020-10-02 PROCEDURE — G8417 CALC BMI ABV UP PARAM F/U: HCPCS | Performed by: INTERNAL MEDICINE

## 2020-10-02 PROCEDURE — G8484 FLU IMMUNIZE NO ADMIN: HCPCS | Performed by: INTERNAL MEDICINE

## 2020-10-02 PROCEDURE — 1123F ACP DISCUSS/DSCN MKR DOCD: CPT | Performed by: INTERNAL MEDICINE

## 2020-10-02 PROCEDURE — 3017F COLORECTAL CA SCREEN DOC REV: CPT | Performed by: INTERNAL MEDICINE

## 2020-10-02 PROCEDURE — G8427 DOCREV CUR MEDS BY ELIG CLIN: HCPCS | Performed by: INTERNAL MEDICINE

## 2020-10-02 PROCEDURE — 93000 ELECTROCARDIOGRAM COMPLETE: CPT | Performed by: INTERNAL MEDICINE

## 2020-10-02 PROCEDURE — 3051F HG A1C>EQUAL 7.0%<8.0%: CPT | Performed by: INTERNAL MEDICINE

## 2020-10-02 PROCEDURE — 2022F DILAT RTA XM EVC RTNOPTHY: CPT | Performed by: INTERNAL MEDICINE

## 2020-10-02 PROCEDURE — 1036F TOBACCO NON-USER: CPT | Performed by: INTERNAL MEDICINE

## 2020-10-02 NOTE — PROGRESS NOTES
78 y.o. here for perop hernia repair. Will be fairly extensive. No prior MI or stroke. No prior chf. No cp or sob at rest. Gets winded with heavier exertion but can do a flight of steps w/o an issue. No n/v/syncope. No LE edema. No orthopnea or PND. 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 Past Medical History:   Diagnosis Date    [facility] failure ([facility]F) ([facility] [address]     Asthma     BPH (benign prostatic hyperplasia)     Diabetes mellitus, type 2 ([facility] [address]     Hypertension      Past Surgical History:   Procedure Laterality Date    APPENDECTOMY      COLONOSCOPY      CYSTOSCOPY      CYSTOSCOPY  07/26/2017    cysto, bilateral stent exchange, left retrograde pyelogram    HERNIA REPAIR      x 4    OTHER SURGICAL HISTORY      bladder diverticuli excision    TURP       Social History     Tobacco Use    Smoking status: Never Smoker    Smokeless tobacco: Never Used   Substance Use Topics    Alcohol use: Yes     Comment: occasionally    Drug use: Not on file     No Known Allergies    Family History   Problem Relation Age of Onset    Dementia Mother     Stroke Father      Review of Systems   General: No fevers, chills, fatigue, or night sweats. No abnormal changes in weight. HEENT: No blurry or decreased vision. No changes in hearing, nasal discharge or sore throat. Cardiovascular: See HPI. No cramping in legs or buttocks when walking. Respiratory: No cough, hemoptysis, or wheezing. No history of asthma. Gastrointestinal: No abdominal pain, hematochezia, melana, or history of GI ulcers. Genito-Urinary: No dysuria or hematuria. No urgency or polyuria. Musculoskeletal: No complaints of joint pain, joint swelling or muscular weakness/soreness. Neurological: No dizziness or headaches. No numbness/tingling, speech problems or weakness. No history of a stroke or TIA.    Psychological: No anxiety or depression  Hematological and Lymphatic: No abnormal bleeding or bruising, blood clots, jaundice. Endocrine: No malaise/lethargy, palpitations, polydipsia/polyuria, temperature intolerance or unexpected weight changes. Skin: No rashes or non-healing ulcers. PE:  Blood pressure 92/60, pulse 64, temperature 98 °F (36.7 °C), weight 203 lb 3.2 oz (92.2 kg), SpO2 97 %. General (appearance): Well devel. No distress  Eyes: Anicteric. EOMI  Neck: Supple. No JVD  Ears/Nose/Mouth/Thorat: No cyanosis  CV: RRR. No m/r/g   Respiratory:  Clear B, normal respiratory effort  GI: Abd s/nt/nd. Skin: Warm, dry. No rashes  Neuro/Psych: Alert and oriented x 3. Appropriate behavior  Ext:  No c/c. No pitting edema  Pulses:  2+ carotid.     Lab Results   Component Value Date    WBC 5.6 08/13/2020    HGB 16.0 08/13/2020    HCT 48.0 08/13/2020    MCV 89.2 08/13/2020     08/13/2020     Lab Results   Component Value Date     08/13/2020    K 4.5 08/13/2020    CL 97 (L) 08/13/2020    CO2 28 08/13/2020    BUN 25 (H) 08/13/2020    CREATININE 1.2 08/13/2020    GLUCOSE 139 (H) 08/13/2020    CALCIUM 9.9 08/13/2020    PROT 7.9 08/13/2020    LABALBU 4.0 08/13/2020    BILITOT 0.4 08/13/2020    ALKPHOS 79 08/13/2020    AST 22 08/13/2020    ALT 20 08/13/2020    LABGLOM >60 08/13/2020    GFRAA >60 08/13/2020    AGRATIO 1.0 (L) 08/13/2020    GLOB 3.9 08/13/2020       Lab Results   Component Value Date    CHOL 158 08/13/2020    CHOL 146 02/01/2018    CHOL 133 07/25/2017     Lab Results   Component Value Date    TRIG 214 (H) 08/13/2020    TRIG 195 (H) 02/01/2018    TRIG 184 (H) 07/25/2017     Lab Results   Component Value Date    HDL 51 08/13/2020    HDL 62 (H) 02/01/2018    HDL 43 07/25/2017     Lab Results   Component Value Date    LDLCALC 64 08/13/2020    LDLCALC 45 02/01/2018    LDLCALC 53 07/25/2017       No results found for: ANNALISA    10/2/20 ECG: NSR    Current Outpatient Medications   Medication Instructions    Acetaminophen (TYLENOL ARTHRITIS PAIN PO) Oral    albuterol sulfate HFA (PROAIR HFA) 108 (90

## 2020-10-08 ENCOUNTER — HOSPITAL ENCOUNTER (OUTPATIENT)
Dept: CARDIOLOGY | Age: 70
Discharge: HOME | End: 2020-10-08
Payer: MEDICARE

## 2020-10-08 LAB
LV EF: 53 %
LVEF MODALITY: NORMAL

## 2020-10-08 PROCEDURE — 6360000002 HC RX W HCPCS: Performed by: INTERNAL MEDICINE

## 2020-10-08 PROCEDURE — 93017 CV STRESS TEST TRACING ONLY: CPT

## 2020-10-08 PROCEDURE — A9502 TC99M TETROFOSMIN: HCPCS | Performed by: INTERNAL MEDICINE

## 2020-10-08 PROCEDURE — 78452 HT MUSCLE IMAGE SPECT MULT: CPT

## 2020-10-08 PROCEDURE — 3430000000 HC RX DIAGNOSTIC RADIOPHARMACEUTICAL: Performed by: INTERNAL MEDICINE

## 2020-10-08 RX ADMIN — TETROFOSMIN 10.9 MILLICURIE: 1.38 INJECTION, POWDER, LYOPHILIZED, FOR SOLUTION INTRAVENOUS at 12:50

## 2020-10-08 RX ADMIN — TETROFOSMIN 33.8 MILLICURIE: 1.38 INJECTION, POWDER, LYOPHILIZED, FOR SOLUTION INTRAVENOUS at 13:45

## 2020-10-08 RX ADMIN — REGADENOSON 0.4 MG: 0.08 INJECTION, SOLUTION INTRAVENOUS at 13:45

## 2020-10-12 ENCOUNTER — OFFICE VISIT (OUTPATIENT)
Dept: PRIMARY CARE CLINIC | Age: 70
End: 2020-10-12
Payer: MEDICARE

## 2020-10-12 ENCOUNTER — TELEPHONE (OUTPATIENT)
Dept: CARDIOLOGY CLINIC | Age: 70
End: 2020-10-12

## 2020-10-12 ENCOUNTER — HOSPITAL ENCOUNTER (OUTPATIENT)
Age: 70
Discharge: HOME | End: 2020-10-12
Payer: MEDICARE

## 2020-10-12 ENCOUNTER — TELEPHONE (OUTPATIENT)
Dept: BARIATRICS/WEIGHT MGMT | Age: 70
End: 2020-10-12

## 2020-10-12 LAB
A/G RATIO: 1.2 (ref 1.1–2.2)
ALBUMIN SERPL-MCNC: 4.1 G/DL (ref 3.4–5)
ALP BLD-CCNC: 73 U/L (ref 40–129)
ALT SERPL-CCNC: 14 U/L (ref 10–40)
ANION GAP SERPL CALCULATED.3IONS-SCNC: 15 MMOL/L (ref 3–16)
AST SERPL-CCNC: 20 U/L (ref 15–37)
BASOPHILS ABSOLUTE: 0 K/UL (ref 0–0.2)
BASOPHILS RELATIVE PERCENT: 0.9 %
BILIRUB SERPL-MCNC: 0.3 MG/DL (ref 0–1)
BILIRUBIN URINE: NEGATIVE
BLOOD, URINE: NEGATIVE
BUN BLDV-MCNC: 23 MG/DL (ref 7–20)
CALCIUM SERPL-MCNC: 9.4 MG/DL (ref 8.3–10.6)
CHLORIDE BLD-SCNC: 102 MMOL/L (ref 99–110)
CHOLESTEROL, TOTAL: 152 MG/DL (ref 0–199)
CLARITY: CLEAR
CO2: 25 MMOL/L (ref 21–32)
COLOR: YELLOW
CREAT SERPL-MCNC: 1.1 MG/DL (ref 0.8–1.3)
EOSINOPHILS ABSOLUTE: 0.3 K/UL (ref 0–0.6)
EOSINOPHILS RELATIVE PERCENT: 4.9 %
EPITHELIAL CELLS, UA: ABNORMAL /HPF (ref 0–5)
GFR AFRICAN AMERICAN: >60
GFR NON-AFRICAN AMERICAN: >60
GLOBULIN: 3.4 G/DL
GLUCOSE BLD-MCNC: 100 MG/DL (ref 70–99)
GLUCOSE URINE: NEGATIVE MG/DL
HCT VFR BLD CALC: 44.9 % (ref 40.5–52.5)
HDLC SERPL-MCNC: 51 MG/DL (ref 40–60)
HEMOGLOBIN: 14.9 G/DL (ref 13.5–17.5)
KETONES, URINE: NEGATIVE MG/DL
LDL CHOLESTEROL CALCULATED: 53 MG/DL
LEUKOCYTE ESTERASE, URINE: ABNORMAL
LYMPHOCYTES ABSOLUTE: 1.9 K/UL (ref 1–5.1)
LYMPHOCYTES RELATIVE PERCENT: 37.1 %
MCH RBC QN AUTO: 29.8 PG (ref 26–34)
MCHC RBC AUTO-ENTMCNC: 33.3 G/DL (ref 31–36)
MCV RBC AUTO: 89.4 FL (ref 80–100)
MICROSCOPIC EXAMINATION: YES
MONOCYTES ABSOLUTE: 0.6 K/UL (ref 0–1.3)
MONOCYTES RELATIVE PERCENT: 11.7 %
NEUTROPHILS ABSOLUTE: 2.4 K/UL (ref 1.7–7.7)
NEUTROPHILS RELATIVE PERCENT: 45.4 %
NITRITE, URINE: NEGATIVE
PDW BLD-RTO: 13.8 % (ref 12.4–15.4)
PH UA: 6 (ref 5–8)
PLATELET # BLD: 242 K/UL (ref 135–450)
PMV BLD AUTO: 7.9 FL (ref 5–10.5)
POTASSIUM SERPL-SCNC: 4.7 MMOL/L (ref 3.5–5.1)
PROTEIN UA: ABNORMAL MG/DL
RBC # BLD: 5.02 M/UL (ref 4.2–5.9)
RBC UA: ABNORMAL /HPF (ref 0–4)
SODIUM BLD-SCNC: 142 MMOL/L (ref 136–145)
SPECIFIC GRAVITY UA: 1.02 (ref 1–1.03)
TOTAL PROTEIN: 7.5 G/DL (ref 6.4–8.2)
TRIGL SERPL-MCNC: 238 MG/DL (ref 0–150)
URINE TYPE: ABNORMAL
UROBILINOGEN, URINE: 0.2 E.U./DL
VLDLC SERPL CALC-MCNC: 48 MG/DL
WBC # BLD: 5.2 K/UL (ref 4–11)
WBC UA: ABNORMAL /HPF (ref 0–5)
YEAST: PRESENT /HPF

## 2020-10-12 PROCEDURE — 99211 OFF/OP EST MAY X REQ PHY/QHP: CPT | Performed by: NURSE PRACTITIONER

## 2020-10-12 PROCEDURE — 80061 LIPID PANEL: CPT

## 2020-10-12 PROCEDURE — 83036 HEMOGLOBIN GLYCOSYLATED A1C: CPT

## 2020-10-12 PROCEDURE — 82043 UR ALBUMIN QUANTITATIVE: CPT

## 2020-10-12 PROCEDURE — 80053 COMPREHEN METABOLIC PANEL: CPT

## 2020-10-12 PROCEDURE — G8428 CUR MEDS NOT DOCUMENT: HCPCS | Performed by: NURSE PRACTITIONER

## 2020-10-12 PROCEDURE — 85025 COMPLETE CBC W/AUTO DIFF WBC: CPT

## 2020-10-12 PROCEDURE — G8417 CALC BMI ABV UP PARAM F/U: HCPCS | Performed by: NURSE PRACTITIONER

## 2020-10-12 PROCEDURE — 82570 ASSAY OF URINE CREATININE: CPT

## 2020-10-12 PROCEDURE — 81001 URINALYSIS AUTO W/SCOPE: CPT

## 2020-10-12 PROCEDURE — 36415 COLL VENOUS BLD VENIPUNCTURE: CPT

## 2020-10-12 NOTE — TELEPHONE ENCOUNTER
Spoke with Cheikh Coronel regarding cardiac cath set up with Dr. Genaro Brink prior to surgery. She is reaching out to staff to schedule.

## 2020-10-12 NOTE — TELEPHONE ENCOUNTER
DR Yoo's nurse Oriana Sanchez called. States the pt needs cardiac cath prior to Friday.  Does he need COVID test?

## 2020-10-12 NOTE — TELEPHONE ENCOUNTER
----- Message from Vidhya Owens MD sent at 10/9/2020  5:10 PM EDT -----  Lets see if patient wants cath at [facility] per recent abnl stress test and per dr redd's recs. If he wants to do it at [facility], lets arrange for that. Let him know he can do w/ dr redd at [facility] as well. Thank you.

## 2020-10-12 NOTE — TELEPHONE ENCOUNTER
Spoke with patient and wife after confirming date with [facility]. Patient is scheduled with Dr. Izaiah Ventura for Left Heart Cath on 10/15/20 at [address] [place] [address], arrival time of 11:30am to the Cath Lab. Please have patient arrive to the main entrance of [facility] and check in with the registration desk. Remind patient to be NPO after midnight (8 hours prior). Do not apply lotions/creams on skin the day of procedure. COVID testing 10/12. Patient and wife were asking if patient's surgery on Friday with Dr. Ryne Barrett will need to be cancelled. I advised them to call Dr. Kaleigh Araujo office as well as me sending message to cardiology staff to guide.

## 2020-10-12 NOTE — PATIENT INSTRUCTIONS

## 2020-10-12 NOTE — PROGRESS NOTES
Conrad Sommer received a viral test for COVID-19. They were educated on isolation and quarantine as appropriate. For any symptoms, they were directed to seek care from their PCP, given contact information to establish with a doctor, directed to an urgent care or the emergency room.

## 2020-10-13 ENCOUNTER — TELEPHONE (OUTPATIENT)
Dept: BARIATRICS/WEIGHT MGMT | Age: 70
End: 2020-10-13

## 2020-10-13 LAB
CREATININE URINE: 63.7 MG/DL (ref 39–259)
ESTIMATED AVERAGE GLUCOSE: 171.4 MG/DL
HBA1C MFR BLD: 7.6 %
MICROALBUMIN UR-MCNC: 3.9 MG/DL
MICROALBUMIN/CREAT UR-RTO: 61.2 MG/G (ref 0–30)
SARS-COV-2, PCR: NOT DETECTED

## 2020-10-13 NOTE — RESULT ENCOUNTER NOTE
Your Covid-10 teste resulted not detected/negative. What happens if I have a negative test?    Remember to wash your hands often, avoid touching your face, stay 6 feet from people you do not live with, and wear a cloth facemask when you go out in public. A negative COVID-19 test at one point in time does not mean you will stay negative. You could become ill with COVID-19 and/or test positive at any time. If you are a close contact of a confirmed or suspected case, continue to stay home and away from others until 14 days after your last exposure. If you do not have symptoms, and were not in close contact with a confirmed or suspected case, you can stop isolating. If you currently have symptoms of COVID-19, and were not in close contact with a confirmed or suspected case, you should keep monitoring symptoms and talk to your doctor or other healthcare provider about staying home and if you need to get tested again. If you develop symptoms of COVID-19, stay at home and away from others and talk to your doctor or other healthcare provider about getting tested again. For additional information, visit coronavirus. [place].gov. For answers to your COVID-19 questions, call [phone]-ASK-[facility] ([phone]).

## 2020-10-13 NOTE — TELEPHONE ENCOUNTER
Absolutely  As long as cardiac cath is normal, we often do surgery even same day as the cath    However, if Dr. Tisha Ken finds something that requires an intervention, then his surgery on Friday would be postponed

## 2020-10-13 NOTE — TELEPHONE ENCOUNTER
Spoke with Mrs. Curtis to let her know cardiac cath will not postpone surgery unless it requires intervention. Pt's wife feels very reassured now. Advised her she will hear from us on Thursday to confirm arrival time for surgery if it's not postponed.

## 2020-10-13 NOTE — TELEPHONE ENCOUNTER
No need to cancel procedure on Friday, he will have a heart catheterization on Thursday and if that is okay, he can proceed with surgery on Friday. Thank you.

## 2020-10-13 NOTE — TELEPHONE ENCOUNTER
LM on VM to hold metformin from now until after procedure and hold Glucotrol that morning. LM for him to CB that he received this message. Dr. Nichole Tim he proceed with procedure on Friday with Dr. Ryne Barrett?

## 2020-10-15 ENCOUNTER — HOSPITAL ENCOUNTER (OUTPATIENT)
Dept: CARDIAC CATH/INVASIVE PROCEDURES | Age: 70
Discharge: HOME | End: 2020-10-15
Attending: INTERNAL MEDICINE | Admitting: INTERNAL MEDICINE
Payer: MEDICARE

## 2020-10-15 VITALS — BODY MASS INDEX: 33.43 KG/M2 | WEIGHT: 208 LBS | HEIGHT: 66 IN

## 2020-10-15 LAB
ANION GAP SERPL CALCULATED.3IONS-SCNC: 12 MMOL/L (ref 3–16)
BUN BLDV-MCNC: 28 MG/DL (ref 7–20)
CALCIUM SERPL-MCNC: 9.2 MG/DL (ref 8.3–10.6)
CHLORIDE BLD-SCNC: 97 MMOL/L (ref 99–110)
CHOLESTEROL, TOTAL: 160 MG/DL (ref 0–199)
CO2: 25 MMOL/L (ref 21–32)
CREAT SERPL-MCNC: 1.1 MG/DL (ref 0.8–1.3)
EKG ATRIAL RATE: 64 BPM
EKG DIAGNOSIS: NORMAL
EKG P AXIS: -2 DEGREES
EKG P-R INTERVAL: 134 MS
EKG Q-T INTERVAL: 422 MS
EKG QRS DURATION: 98 MS
EKG QTC CALCULATION (BAZETT): 435 MS
EKG R AXIS: 25 DEGREES
EKG T AXIS: 7 DEGREES
EKG VENTRICULAR RATE: 64 BPM
GFR AFRICAN AMERICAN: >60
GFR NON-AFRICAN AMERICAN: >60
GLUCOSE BLD-MCNC: 184 MG/DL (ref 70–99)
HCT VFR BLD CALC: 46.5 % (ref 40.5–52.5)
HDLC SERPL-MCNC: 60 MG/DL (ref 40–60)
HEMOGLOBIN: 15.7 G/DL (ref 13.5–17.5)
INR BLD: 1 (ref 0.86–1.14)
LDL CHOLESTEROL CALCULATED: 64 MG/DL
MCH RBC QN AUTO: 29.6 PG (ref 26–34)
MCHC RBC AUTO-ENTMCNC: 33.6 G/DL (ref 31–36)
MCV RBC AUTO: 87.9 FL (ref 80–100)
PDW BLD-RTO: 14.3 % (ref 12.4–15.4)
PLATELET # BLD: 256 K/UL (ref 135–450)
PMV BLD AUTO: 7.3 FL (ref 5–10.5)
POC ACT LR: 257 SEC
POTASSIUM SERPL-SCNC: 5.1 MMOL/L (ref 3.5–5.1)
PROTHROMBIN TIME: 11.6 SEC (ref 10–13.2)
RBC # BLD: 5.29 M/UL (ref 4.2–5.9)
SODIUM BLD-SCNC: 134 MMOL/L (ref 136–145)
TRIGL SERPL-MCNC: 179 MG/DL (ref 0–150)
VLDLC SERPL CALC-MCNC: 36 MG/DL
WBC # BLD: 7 K/UL (ref 4–11)

## 2020-10-15 PROCEDURE — 93010 ELECTROCARDIOGRAM REPORT: CPT | Performed by: INTERNAL MEDICINE

## 2020-10-15 PROCEDURE — 80061 LIPID PANEL: CPT

## 2020-10-15 PROCEDURE — 2709999900 HC NON-CHARGEABLE SUPPLY

## 2020-10-15 PROCEDURE — 2500000003 HC RX 250 WO HCPCS

## 2020-10-15 PROCEDURE — 85027 COMPLETE CBC AUTOMATED: CPT

## 2020-10-15 PROCEDURE — C1894 INTRO/SHEATH, NON-LASER: HCPCS

## 2020-10-15 PROCEDURE — 93458 L HRT ARTERY/VENTRICLE ANGIO: CPT | Performed by: INTERNAL MEDICINE

## 2020-10-15 PROCEDURE — 85347 COAGULATION TIME ACTIVATED: CPT

## 2020-10-15 PROCEDURE — 93005 ELECTROCARDIOGRAM TRACING: CPT | Performed by: INTERNAL MEDICINE

## 2020-10-15 PROCEDURE — 93458 L HRT ARTERY/VENTRICLE ANGIO: CPT

## 2020-10-15 PROCEDURE — 6370000000 HC RX 637 (ALT 250 FOR IP)

## 2020-10-15 PROCEDURE — 99152 MOD SED SAME PHYS/QHP 5/>YRS: CPT

## 2020-10-15 PROCEDURE — 85610 PROTHROMBIN TIME: CPT

## 2020-10-15 PROCEDURE — 6360000002 HC RX W HCPCS

## 2020-10-15 PROCEDURE — 99153 MOD SED SAME PHYS/QHP EA: CPT

## 2020-10-15 PROCEDURE — C1769 GUIDE WIRE: HCPCS

## 2020-10-15 PROCEDURE — 80048 BASIC METABOLIC PNL TOTAL CA: CPT

## 2020-10-15 RX ORDER — MIDAZOLAM HYDROCHLORIDE 5 MG/ML
INJECTION INTRAMUSCULAR; INTRAVENOUS
Status: COMPLETED | OUTPATIENT
Start: 2020-10-15 | End: 2020-10-15

## 2020-10-15 RX ORDER — SODIUM CHLORIDE 0.9 % (FLUSH) 0.9 %
10 SYRINGE (ML) INJECTION PRN
Status: CANCELLED | OUTPATIENT
Start: 2020-10-15

## 2020-10-15 RX ORDER — SODIUM CHLORIDE 9 MG/ML
1000 INJECTION, SOLUTION INTRAVENOUS CONTINUOUS
Status: DISCONTINUED | OUTPATIENT
Start: 2020-10-15 | End: 2020-10-15 | Stop reason: HOSPADM

## 2020-10-15 RX ORDER — ASPIRIN 81 MG/1
81 TABLET ORAL DAILY
Qty: 30 TABLET | Refills: 5 | Status: SHIPPED | OUTPATIENT
Start: 2020-10-18

## 2020-10-15 RX ORDER — ATORVASTATIN CALCIUM 10 MG/1
10 TABLET, FILM COATED ORAL EVERY EVENING
Qty: 30 TABLET | Refills: 3 | Status: SHIPPED | OUTPATIENT
Start: 2020-10-15

## 2020-10-15 RX ORDER — ACETAMINOPHEN 325 MG/1
650 TABLET ORAL EVERY 4 HOURS PRN
Status: CANCELLED | OUTPATIENT
Start: 2020-10-15

## 2020-10-15 RX ORDER — SODIUM CHLORIDE 0.9 % (FLUSH) 0.9 %
10 SYRINGE (ML) INJECTION EVERY 12 HOURS SCHEDULED
Status: CANCELLED | OUTPATIENT
Start: 2020-10-15

## 2020-10-15 RX ORDER — FENTANYL CITRATE 50 UG/ML
INJECTION, SOLUTION INTRAMUSCULAR; INTRAVENOUS
Status: COMPLETED | OUTPATIENT
Start: 2020-10-15 | End: 2020-10-15

## 2020-10-15 RX ORDER — ASPIRIN 325 MG
325 TABLET ORAL ONCE
Status: COMPLETED | OUTPATIENT
Start: 2020-10-15 | End: 2020-10-15

## 2020-10-15 RX ADMIN — FENTANYL CITRATE 25 MCG: 50 INJECTION, SOLUTION INTRAMUSCULAR; INTRAVENOUS at 13:56

## 2020-10-15 RX ADMIN — SODIUM CHLORIDE 1000 ML: 9 INJECTION, SOLUTION INTRAVENOUS at 12:31

## 2020-10-15 RX ADMIN — MIDAZOLAM HYDROCHLORIDE 2 MG: 5 INJECTION INTRAMUSCULAR; INTRAVENOUS at 13:56

## 2020-10-15 RX ADMIN — Medication 325 MG: at 12:32

## 2020-10-15 NOTE — PROCEDURES
CARDIAC CATHETERIZATION REPORT     Procedure Date:  10/15/2020  Patient Name: Di Frazier  MRN: [id number] : 1950      INDICATION     Abnormal stress test, intermediate risk    PROCEDURES PERFORMED     Left heart catheterization  Coronary angiogam  Coronary cath      '    PROCEDURE DESCRIPTION   Risks/benefits/alternatives/outcomes were discussed with patient and/or family and informed consent was obtained. Using the [facility] scale, the patient's right radial artery was found to be a level B. Patient was prepped draped in the usual sterile fashion. Local anaesthetic was applied over puncture site. Using a front  wall technique along with ultrasound guidance, a 4/5 Turkish Terumo sheath was inserted into right radial artery. Verapamil, nitroglycerin, nicardipine were administered through the sheath. Heparin was administered. Aspirin was given as well. Diagnostic 5 Georgian Medtronic ultra catheter were used for diagnostic angiograms. Pigtail was used for left heart catheterization. At the conclusion of the procedure, a TR band was placed over the puncture site and hemostasis was obtained. There were no immediate complications. I supervised sedation with versed 2 mg/fentanyl 25 mcg during the procedure. 70 cc contrast was utilized. <20cc EBL. FINDINGS       Left heart catheterization    LVEDP  10   GRADIENT ACROSS AORTIC VALVE  none       CORONARY ARTERIES    LM Less than 10% sdtcioid-qvb-ceuegu stenosis         LAD  Ostial/proximal 30% stenosis, proximal-mid 60-70 % stenosis. Distal less than 10% stenosis. LCX Less than 10% lrjwixcx-ovd-kbpzku stenosis         RI  10 to 20% olneusjx-awz-igeswp stenosis. RCA  Dominant, proximal-mid 70% stenosis. Distal less than 10% stenosis.              CONCLUSIONS:     Two-vessel CAD    This can be managed medically, I discussed with patient and wife that should take aspirin daily along with statin, will order those medications    Patient would not be at prohibitive risk for planned surgery. He would be considered at moderate perioperative cardiovascular risk for the surgery. Patient has good functional status and says he can walk 1-2 blocks without any difficulty/symptoms.     Revascularization is not felt to be needed at this time

## 2020-10-15 NOTE — H&P
Brief Pre-Op Note/Sedation Assessment      Liv Springfield  1950  Cath Pool Rm/NONE      [id number]  12:38 PM    Planned Procedure: Cardiac Catheterization Procedure    Post Procedure Plan: Return to same level of care    Consent: I have discussed with the patient and/or the patient representative the indication, alternatives, and the possible risks and/or complications of the planned procedure and the anesthesia methods. The patient and/or patient representative appear to understand and agree to proceed. DISCUSSION OF CARDIAC CATHETERIZATION PROCEDURES: The procedures, indications, risks and alternatives have been discussed with the patient and, as appropriate, with the patient's guardian . Risks discussed included, but are not limited to, bleeding, development of blood clots/emboli, damage to blood vessels, renal failure, malignant cardiac arrhythmias, stroke, heart attack, emergent coronary bypass surgery, death, dye allergy. The patient (and guardian as appropriate) expressed understanding of the aforementioned and wished to proceed.           Chief Complaint: Pre-Op Clearance, dyspnea, may be anginal equivalent      Indications for Cath Procedure:  Pre-Operative Evaluation - Surgery Type: Non-Cardiac Surgery, Functional Capacity: Unknown, Surgical Risk: Intermediate, Solid Organ Transplant Surgery:  No  Anginal Classification within 2 weeks:  CCS II - Slight limitation, with angina only during vigorous physical activity  NYHA Heart Failure Class within 2 weeks: No symptoms  Is Cath Lab Visit Valve-related?: No  Surgical Risk: N/A  Functional Type: N/A    Anti- Anginal Meds within 2 weeks:   Yes: Non-Statin (Any)    Stress or Imaging Studies Performed (within 6 months):  Stress Test with SPECT Result: Positive:  inferior Risk/Extent of Ischemia:  Intermediate     Vital Signs:  Ht 5' 6\" (1.676 m)   Wt 208 lb (94.3 kg)   BMI 33.57 kg/m²     134/70  HR 68    Allergies:  No Known Allergies    Past Medical History:  Past Medical History:   Diagnosis Date    [facility]F) [facility] [address]     Asthma     BPH (benign prostatic hyperplasia)     Diabetes mellitus, type 2 ([facility] [address]     Hypertension          Surgical History:  Past Surgical History:   Procedure Laterality Date    APPENDECTOMY      COLONOSCOPY      CYSTOSCOPY      CYSTOSCOPY  07/26/2017    cysto, bilateral stent exchange, left retrograde pyelogram    HERNIA REPAIR      x 4    OTHER SURGICAL HISTORY      bladder diverticuli excision    TURP           Medications:  Current Facility-Administered Medications   Medication Dose Route Frequency Provider Last Rate Last Dose    0.9 % sodium chloride infusion  1,000 mL Intravenous Continuous Sedrick Álvarez MD 30 mL/hr at 10/15/20 1231 1,000 mL at 10/15/20 1231           Pre-Sedation:    Pre-Sedation Documentation and Exam:  I have assessed the patient and agree with the H&P present on the chart. Prior History of Anesthesia Complications:   Is a difficult intubation    Modified Mallampati:  III (soft palate, base of uvula visible)    ASA Classification:  Class 3 - A patient with severe systemic disease that limits activity but is not incapacitating      Mary Scale: Activity:  2 - Able to move 4 extremities voluntarily on command  Respiration:  2 - Able to breathe deeply and cough freely  Circulation:  2 - BP+/- 20mmHg of normal  Consciousness:  2 - Fully awake  Oxygen Saturation (color):  2 - Able to maintain oxygen saturation >92% on room air    Sedation/Anesthesia Plan:  Guard the patient's safety and welfare. Minimize physical discomfort and pain. Minimize negative psychological responses to treatment by providing sedation and analgesia and maximize the potential amnesia. Patient to meet pre-procedure discharge plan.     Medication Planned:  midazolam intravenously and fentanyl intravenously    Patient is an appropriate candidate for plan of sedation: yes      Electronically signed by Aminah Bingham MD on 10/15/2020 at 12:38 PM

## 2020-10-16 ENCOUNTER — TELEPHONE (OUTPATIENT)
Dept: BARIATRICS/WEIGHT MGMT | Age: 70
End: 2020-10-16

## 2020-10-16 NOTE — TELEPHONE ENCOUNTER
Dr. Shantal Becker spoke with pt regarding postponing surgery  Scheduled on 10/16/20, due to  patient being given a full dose of ASA for cardiac cath the day before scheduled surgery. Surgery has been rescheduled to 10/23/20 at 2:00 pm.    Patient arrival at noon. Patient to be re covid tested on 10/19/20. Pt holding baby ASA until surgery. Pt's wife stated they were directed to hold his Metformin x 7 days by PCP as well. Order routed to [address] [place] [address], pt's wife notified of the above and voices understanding.

## 2020-10-19 ENCOUNTER — OFFICE VISIT (OUTPATIENT)
Dept: PRIMARY CARE CLINIC | Age: 70
End: 2020-10-19
Payer: MEDICARE

## 2020-10-19 PROCEDURE — G8428 CUR MEDS NOT DOCUMENT: HCPCS | Performed by: NURSE PRACTITIONER

## 2020-10-19 PROCEDURE — 99211 OFF/OP EST MAY X REQ PHY/QHP: CPT | Performed by: NURSE PRACTITIONER

## 2020-10-19 PROCEDURE — G8417 CALC BMI ABV UP PARAM F/U: HCPCS | Performed by: NURSE PRACTITIONER

## 2020-10-19 NOTE — PROGRESS NOTES
Antoine Laws received a viral test for COVID-19. They were educated on isolation and quarantine as appropriate. For any symptoms, they were directed to seek care from their PCP, given contact information to establish with a doctor, directed to an urgent care or the emergency room.

## 2020-10-19 NOTE — PROGRESS NOTES
order in effect during my hospitalization, the order may or may not be in effect during this procedure. I give my doctor permission to give me blood or blood products. I understand that there are risks with receiving blood such as hepatitis, AIDS, fever, or allergic reaction. I acknowledge that the risks, benefits, and alternatives of this treatment have been explained to me and that no express or implied warranty has been given by the hospital, any blood bank, or any person or entity as to the blood or blood components transfused. At the discretion of my doctor, I agree to allow observers, equipment/product representatives and allow photographing, and/or televising of the procedure, provided my name or identity is maintained confidentially. I agree the hospital may dispose of or use for scientific or educational purposes any tissue, fluid, or body parts which may be removed.     ________________________________Date________Time______ am/pm  ([place] [facility])  Patient or Signature of Closest Relative or Legal Guardian    ________________________________Date________Time______am/pm      Page 1 of  1  Witness

## 2020-10-20 LAB — SARS-COV-2, NAA: NOT DETECTED

## 2020-10-21 NOTE — PROGRESS NOTES

## 2020-10-22 ENCOUNTER — ANESTHESIA EVENT (OUTPATIENT)
Dept: OPERATING ROOM | Age: 70
DRG: 336 | End: 2020-10-22
Payer: MEDICARE

## 2020-10-22 ENCOUNTER — TELEPHONE (OUTPATIENT)
Dept: BARIATRICS/WEIGHT MGMT | Age: 70
End: 2020-10-22

## 2020-10-22 NOTE — TELEPHONE ENCOUNTER
Left message for pt to confirm his noon arrival for his 2pm surgery on 10/23/20. Asked that pt return call to office to confirm receipt of this message.

## 2020-10-23 ENCOUNTER — ANESTHESIA (OUTPATIENT)
Dept: OPERATING ROOM | Age: 70
DRG: 336 | End: 2020-10-23
Payer: MEDICARE

## 2020-10-23 ENCOUNTER — HOSPITAL ENCOUNTER (INPATIENT)
Age: 70
LOS: 3 days | Discharge: HOME | DRG: 336 | End: 2020-10-26
Attending: SURGERY | Admitting: SURGERY
Payer: MEDICARE

## 2020-10-23 VITALS
TEMPERATURE: 96.4 F | RESPIRATION RATE: 15 BRPM | SYSTOLIC BLOOD PRESSURE: 131 MMHG | OXYGEN SATURATION: 100 % | DIASTOLIC BLOOD PRESSURE: 74 MMHG

## 2020-10-23 PROBLEM — K43.0 INCISIONAL HERNIA, INCARCERATED: Status: ACTIVE | Noted: 2020-10-23

## 2020-10-23 LAB
GLUCOSE BLD-MCNC: 152 MG/DL (ref 70–99)
GLUCOSE BLD-MCNC: 187 MG/DL (ref 70–99)
PERFORMED ON: ABNORMAL
PERFORMED ON: ABNORMAL

## 2020-10-23 PROCEDURE — 2500000003 HC RX 250 WO HCPCS: Performed by: NURSE ANESTHETIST, CERTIFIED REGISTERED

## 2020-10-23 PROCEDURE — 2580000003 HC RX 258: Performed by: STUDENT IN AN ORGANIZED HEALTH CARE EDUCATION/TRAINING PROGRAM

## 2020-10-23 PROCEDURE — 2580000003 HC RX 258: Performed by: ANESTHESIOLOGY

## 2020-10-23 PROCEDURE — 0DNW4ZZ RELEASE PERITONEUM, PERCUTANEOUS ENDOSCOPIC APPROACH: ICD-10-PCS | Performed by: SURGERY

## 2020-10-23 PROCEDURE — C9113 INJ PANTOPRAZOLE SODIUM, VIA: HCPCS | Performed by: STUDENT IN AN ORGANIZED HEALTH CARE EDUCATION/TRAINING PROGRAM

## 2020-10-23 PROCEDURE — 2580000003 HC RX 258: Performed by: NURSE ANESTHETIST, CERTIFIED REGISTERED

## 2020-10-23 PROCEDURE — 6360000002 HC RX W HCPCS: Performed by: STUDENT IN AN ORGANIZED HEALTH CARE EDUCATION/TRAINING PROGRAM

## 2020-10-23 PROCEDURE — 0T9B80Z DRAINAGE OF BLADDER WITH DRAINAGE DEVICE, VIA NATURAL OR ARTIFICIAL OPENING ENDOSCOPIC: ICD-10-PCS | Performed by: UROLOGY

## 2020-10-23 PROCEDURE — 2700000000 HC OXYGEN THERAPY PER DAY

## 2020-10-23 PROCEDURE — 3600000004 HC SURGERY LEVEL 4 BASE: Performed by: SURGERY

## 2020-10-23 PROCEDURE — 6360000002 HC RX W HCPCS: Performed by: SURGERY

## 2020-10-23 PROCEDURE — 0T7D8ZZ DILATION OF URETHRA, VIA NATURAL OR ARTIFICIAL OPENING ENDOSCOPIC: ICD-10-PCS | Performed by: UROLOGY

## 2020-10-23 PROCEDURE — C1769 GUIDE WIRE: HCPCS | Performed by: SURGERY

## 2020-10-23 PROCEDURE — 94640 AIRWAY INHALATION TREATMENT: CPT

## 2020-10-23 PROCEDURE — 94150 VITAL CAPACITY TEST: CPT

## 2020-10-23 PROCEDURE — 0WUF4JZ SUPPLEMENT ABDOMINAL WALL WITH SYNTHETIC SUBSTITUTE, PERCUTANEOUS ENDOSCOPIC APPROACH: ICD-10-PCS | Performed by: SURGERY

## 2020-10-23 PROCEDURE — 2709999900 HC NON-CHARGEABLE SUPPLY: Performed by: SURGERY

## 2020-10-23 PROCEDURE — 6360000002 HC RX W HCPCS: Performed by: NURSE ANESTHETIST, CERTIFIED REGISTERED

## 2020-10-23 PROCEDURE — 2720000010 HC SURG SUPPLY STERILE: Performed by: SURGERY

## 2020-10-23 PROCEDURE — 3700000001 HC ADD 15 MINUTES (ANESTHESIA): Performed by: SURGERY

## 2020-10-23 PROCEDURE — 7100000000 HC PACU RECOVERY - FIRST 15 MIN: Performed by: SURGERY

## 2020-10-23 PROCEDURE — 2500000003 HC RX 250 WO HCPCS: Performed by: SURGERY

## 2020-10-23 PROCEDURE — 3600000014 HC SURGERY LEVEL 4 ADDTL 15MIN: Performed by: SURGERY

## 2020-10-23 PROCEDURE — 7100000001 HC PACU RECOVERY - ADDTL 15 MIN: Performed by: SURGERY

## 2020-10-23 PROCEDURE — 94761 N-INVAS EAR/PLS OXIMETRY MLT: CPT

## 2020-10-23 PROCEDURE — 8E0W4CZ ROBOTIC ASSISTED PROCEDURE OF TRUNK REGION, PERCUTANEOUS ENDOSCOPIC APPROACH: ICD-10-PCS | Performed by: SURGERY

## 2020-10-23 PROCEDURE — 2580000003 HC RX 258: Performed by: SURGERY

## 2020-10-23 PROCEDURE — 6370000000 HC RX 637 (ALT 250 FOR IP): Performed by: STUDENT IN AN ORGANIZED HEALTH CARE EDUCATION/TRAINING PROGRAM

## 2020-10-23 PROCEDURE — 88302 TISSUE EXAM BY PATHOLOGIST: CPT

## 2020-10-23 PROCEDURE — 15734 MUSCLE-SKIN GRAFT TRUNK: CPT | Performed by: SURGERY

## 2020-10-23 PROCEDURE — C1781 MESH (IMPLANTABLE): HCPCS | Performed by: SURGERY

## 2020-10-23 PROCEDURE — 3700000000 HC ANESTHESIA ATTENDED CARE: Performed by: SURGERY

## 2020-10-23 PROCEDURE — 49657 PR LAP, RECURRENT INCISIONAL HERNIA REPAIR,INCARCERATED: CPT | Performed by: SURGERY

## 2020-10-23 PROCEDURE — 1200000000 HC SEMI PRIVATE

## 2020-10-23 DEVICE — MESH HERN W6XL8IN ELLIPSE W/ ECHO PS POS SYS VENTRALIGHT ST: Type: IMPLANTABLE DEVICE | Site: ABDOMEN | Status: FUNCTIONAL

## 2020-10-23 RX ORDER — MIDAZOLAM HYDROCHLORIDE 1 MG/ML
INJECTION INTRAMUSCULAR; INTRAVENOUS PRN
Status: DISCONTINUED | OUTPATIENT
Start: 2020-10-23 | End: 2020-10-23 | Stop reason: SDUPTHER

## 2020-10-23 RX ORDER — LIDOCAINE HYDROCHLORIDE 10 MG/ML
1 INJECTION, SOLUTION EPIDURAL; INFILTRATION; INTRACAUDAL; PERINEURAL
Status: DISCONTINUED | OUTPATIENT
Start: 2020-10-23 | End: 2020-10-23 | Stop reason: HOSPADM

## 2020-10-23 RX ORDER — SODIUM CHLORIDE 0.9 % (FLUSH) 0.9 %
10 SYRINGE (ML) INJECTION EVERY 12 HOURS SCHEDULED
Status: DISCONTINUED | OUTPATIENT
Start: 2020-10-23 | End: 2020-10-26 | Stop reason: HOSPADM

## 2020-10-23 RX ORDER — BUDESONIDE 0.5 MG/2ML
0.5 INHALANT ORAL 2 TIMES DAILY
Status: DISCONTINUED | OUTPATIENT
Start: 2020-10-23 | End: 2020-10-26 | Stop reason: HOSPADM

## 2020-10-23 RX ORDER — GLYCOPYRROLATE 1 MG/5 ML
SYRINGE (ML) INTRAVENOUS PRN
Status: DISCONTINUED | OUTPATIENT
Start: 2020-10-23 | End: 2020-10-23 | Stop reason: SDUPTHER

## 2020-10-23 RX ORDER — LABETALOL 20 MG/4 ML (5 MG/ML) INTRAVENOUS SYRINGE
5 EVERY 10 MIN PRN
Status: DISCONTINUED | OUTPATIENT
Start: 2020-10-23 | End: 2020-10-23 | Stop reason: HOSPADM

## 2020-10-23 RX ORDER — SODIUM CHLORIDE 0.9 % (FLUSH) 0.9 %
10 SYRINGE (ML) INJECTION EVERY 12 HOURS SCHEDULED
Status: DISCONTINUED | OUTPATIENT
Start: 2020-10-23 | End: 2020-10-23 | Stop reason: HOSPADM

## 2020-10-23 RX ORDER — ROCURONIUM BROMIDE 10 MG/ML
INJECTION, SOLUTION INTRAVENOUS PRN
Status: DISCONTINUED | OUTPATIENT
Start: 2020-10-23 | End: 2020-10-23 | Stop reason: SDUPTHER

## 2020-10-23 RX ORDER — MAGNESIUM HYDROXIDE 1200 MG/15ML
LIQUID ORAL CONTINUOUS PRN
Status: COMPLETED | OUTPATIENT
Start: 2020-10-23 | End: 2020-10-23

## 2020-10-23 RX ORDER — SODIUM CHLORIDE 9 MG/ML
10 INJECTION INTRAVENOUS DAILY
Status: DISCONTINUED | OUTPATIENT
Start: 2020-10-23 | End: 2020-10-26 | Stop reason: HOSPADM

## 2020-10-23 RX ORDER — LIDOCAINE HYDROCHLORIDE 20 MG/ML
INJECTION, SOLUTION INTRAVENOUS PRN
Status: DISCONTINUED | OUTPATIENT
Start: 2020-10-23 | End: 2020-10-23 | Stop reason: SDUPTHER

## 2020-10-23 RX ORDER — FENTANYL CITRATE 50 UG/ML
25 INJECTION, SOLUTION INTRAMUSCULAR; INTRAVENOUS EVERY 5 MIN PRN
Status: DISCONTINUED | OUTPATIENT
Start: 2020-10-23 | End: 2020-10-23 | Stop reason: HOSPADM

## 2020-10-23 RX ORDER — TAMSULOSIN HYDROCHLORIDE 0.4 MG/1
0.4 CAPSULE ORAL DAILY
Status: DISCONTINUED | OUTPATIENT
Start: 2020-10-24 | End: 2020-10-26 | Stop reason: HOSPADM

## 2020-10-23 RX ORDER — SODIUM CHLORIDE 0.9 % (FLUSH) 0.9 %
10 SYRINGE (ML) INJECTION PRN
Status: DISCONTINUED | OUTPATIENT
Start: 2020-10-23 | End: 2020-10-23 | Stop reason: HOSPADM

## 2020-10-23 RX ORDER — PROPOFOL 10 MG/ML
INJECTION, EMULSION INTRAVENOUS PRN
Status: DISCONTINUED | OUTPATIENT
Start: 2020-10-23 | End: 2020-10-23 | Stop reason: SDUPTHER

## 2020-10-23 RX ORDER — DEXAMETHASONE SODIUM PHOSPHATE 4 MG/ML
INJECTION, SOLUTION INTRA-ARTICULAR; INTRALESIONAL; INTRAMUSCULAR; INTRAVENOUS; SOFT TISSUE PRN
Status: DISCONTINUED | OUTPATIENT
Start: 2020-10-23 | End: 2020-10-23 | Stop reason: SDUPTHER

## 2020-10-23 RX ORDER — BUDESONIDE AND FORMOTEROL FUMARATE DIHYDRATE 160; 4.5 UG/1; UG/1
2 AEROSOL RESPIRATORY (INHALATION) 2 TIMES DAILY
Status: DISCONTINUED | OUTPATIENT
Start: 2020-10-23 | End: 2020-10-23 | Stop reason: CLARIF

## 2020-10-23 RX ORDER — SODIUM CHLORIDE, SODIUM LACTATE, POTASSIUM CHLORIDE, CALCIUM CHLORIDE 600; 310; 30; 20 MG/100ML; MG/100ML; MG/100ML; MG/100ML
INJECTION, SOLUTION INTRAVENOUS CONTINUOUS PRN
Status: DISCONTINUED | OUTPATIENT
Start: 2020-10-23 | End: 2020-10-23 | Stop reason: SDUPTHER

## 2020-10-23 RX ORDER — ONDANSETRON 2 MG/ML
4 INJECTION INTRAMUSCULAR; INTRAVENOUS
Status: DISCONTINUED | OUTPATIENT
Start: 2020-10-23 | End: 2020-10-23 | Stop reason: HOSPADM

## 2020-10-23 RX ORDER — BUPIVACAINE HYDROCHLORIDE 5 MG/ML
INJECTION, SOLUTION EPIDURAL; INTRACAUDAL PRN
Status: DISCONTINUED | OUTPATIENT
Start: 2020-10-23 | End: 2020-10-23 | Stop reason: ALTCHOICE

## 2020-10-23 RX ORDER — ALBUTEROL SULFATE 2.5 MG/3ML
2.5 SOLUTION RESPIRATORY (INHALATION) EVERY 6 HOURS PRN
Status: DISCONTINUED | OUTPATIENT
Start: 2020-10-23 | End: 2020-10-26 | Stop reason: HOSPADM

## 2020-10-23 RX ORDER — SUCCINYLCHOLINE CHLORIDE 20 MG/ML
INJECTION INTRAMUSCULAR; INTRAVENOUS PRN
Status: DISCONTINUED | OUTPATIENT
Start: 2020-10-23 | End: 2020-10-23 | Stop reason: SDUPTHER

## 2020-10-23 RX ORDER — HEPARIN SODIUM 5000 [USP'U]/ML
5000 INJECTION, SOLUTION INTRAVENOUS; SUBCUTANEOUS ONCE
Status: COMPLETED | OUTPATIENT
Start: 2020-10-23 | End: 2020-10-23

## 2020-10-23 RX ORDER — ONDANSETRON 4 MG/1
4 TABLET, ORALLY DISINTEGRATING ORAL EVERY 8 HOURS PRN
Status: DISCONTINUED | OUTPATIENT
Start: 2020-10-23 | End: 2020-10-26 | Stop reason: HOSPADM

## 2020-10-23 RX ORDER — OXYCODONE HYDROCHLORIDE 5 MG/1
5 TABLET ORAL EVERY 4 HOURS PRN
Status: DISCONTINUED | OUTPATIENT
Start: 2020-10-23 | End: 2020-10-26 | Stop reason: HOSPADM

## 2020-10-23 RX ORDER — HYDROMORPHONE HCL 110MG/55ML
PATIENT CONTROLLED ANALGESIA SYRINGE INTRAVENOUS PRN
Status: DISCONTINUED | OUTPATIENT
Start: 2020-10-23 | End: 2020-10-23 | Stop reason: SDUPTHER

## 2020-10-23 RX ORDER — ENALAPRILAT 2.5 MG/2ML
1.25 INJECTION INTRAVENOUS
Status: DISCONTINUED | OUTPATIENT
Start: 2020-10-23 | End: 2020-10-23 | Stop reason: HOSPADM

## 2020-10-23 RX ORDER — CEFAZOLIN SODIUM 2 G/50ML
2 SOLUTION INTRAVENOUS ONCE
Status: COMPLETED | OUTPATIENT
Start: 2020-10-23 | End: 2020-10-23

## 2020-10-23 RX ORDER — SODIUM CHLORIDE 0.9 % (FLUSH) 0.9 %
10 SYRINGE (ML) INJECTION PRN
Status: DISCONTINUED | OUTPATIENT
Start: 2020-10-23 | End: 2020-10-26 | Stop reason: HOSPADM

## 2020-10-23 RX ORDER — SODIUM CHLORIDE, SODIUM LACTATE, POTASSIUM CHLORIDE, CALCIUM CHLORIDE 600; 310; 30; 20 MG/100ML; MG/100ML; MG/100ML; MG/100ML
INJECTION, SOLUTION INTRAVENOUS CONTINUOUS
Status: DISCONTINUED | OUTPATIENT
Start: 2020-10-23 | End: 2020-10-23

## 2020-10-23 RX ORDER — ONDANSETRON 2 MG/ML
4 INJECTION INTRAMUSCULAR; INTRAVENOUS EVERY 6 HOURS PRN
Status: DISCONTINUED | OUTPATIENT
Start: 2020-10-23 | End: 2020-10-26 | Stop reason: HOSPADM

## 2020-10-23 RX ORDER — ONDANSETRON 2 MG/ML
INJECTION INTRAMUSCULAR; INTRAVENOUS PRN
Status: DISCONTINUED | OUTPATIENT
Start: 2020-10-23 | End: 2020-10-23 | Stop reason: SDUPTHER

## 2020-10-23 RX ORDER — FUROSEMIDE 10 MG/ML
INJECTION INTRAMUSCULAR; INTRAVENOUS PRN
Status: DISCONTINUED | OUTPATIENT
Start: 2020-10-23 | End: 2020-10-23 | Stop reason: SDUPTHER

## 2020-10-23 RX ORDER — MECOBALAMIN 5000 MCG
5 TABLET,DISINTEGRATING ORAL DAILY
Status: DISCONTINUED | OUTPATIENT
Start: 2020-10-23 | End: 2020-10-26 | Stop reason: HOSPADM

## 2020-10-23 RX ORDER — PANTOPRAZOLE SODIUM 40 MG/10ML
40 INJECTION, POWDER, LYOPHILIZED, FOR SOLUTION INTRAVENOUS DAILY
Status: DISCONTINUED | OUTPATIENT
Start: 2020-10-23 | End: 2020-10-24

## 2020-10-23 RX ORDER — HYDRALAZINE HYDROCHLORIDE 20 MG/ML
5 INJECTION INTRAMUSCULAR; INTRAVENOUS EVERY 5 MIN PRN
Status: DISCONTINUED | OUTPATIENT
Start: 2020-10-23 | End: 2020-10-23 | Stop reason: HOSPADM

## 2020-10-23 RX ORDER — FENTANYL CITRATE 50 UG/ML
INJECTION, SOLUTION INTRAMUSCULAR; INTRAVENOUS PRN
Status: DISCONTINUED | OUTPATIENT
Start: 2020-10-23 | End: 2020-10-23 | Stop reason: SDUPTHER

## 2020-10-23 RX ORDER — ARFORMOTEROL TARTRATE 15 UG/2ML
15 SOLUTION RESPIRATORY (INHALATION) 2 TIMES DAILY
Status: DISCONTINUED | OUTPATIENT
Start: 2020-10-23 | End: 2020-10-26 | Stop reason: HOSPADM

## 2020-10-23 RX ORDER — FENTANYL CITRATE 50 UG/ML
50 INJECTION, SOLUTION INTRAMUSCULAR; INTRAVENOUS EVERY 5 MIN PRN
Status: DISCONTINUED | OUTPATIENT
Start: 2020-10-23 | End: 2020-10-23 | Stop reason: HOSPADM

## 2020-10-23 RX ORDER — OXYCODONE HYDROCHLORIDE 5 MG/1
10 TABLET ORAL EVERY 4 HOURS PRN
Status: DISCONTINUED | OUTPATIENT
Start: 2020-10-23 | End: 2020-10-26 | Stop reason: HOSPADM

## 2020-10-23 RX ORDER — SODIUM CHLORIDE, SODIUM LACTATE, POTASSIUM CHLORIDE, CALCIUM CHLORIDE 600; 310; 30; 20 MG/100ML; MG/100ML; MG/100ML; MG/100ML
INJECTION, SOLUTION INTRAVENOUS CONTINUOUS
Status: DISCONTINUED | OUTPATIENT
Start: 2020-10-23 | End: 2020-10-24

## 2020-10-23 RX ORDER — ALBUTEROL SULFATE 90 UG/1
2 AEROSOL, METERED RESPIRATORY (INHALATION) EVERY 6 HOURS PRN
Status: DISCONTINUED | OUTPATIENT
Start: 2020-10-23 | End: 2020-10-23 | Stop reason: CLARIF

## 2020-10-23 RX ADMIN — OXYCODONE 5 MG: 5 TABLET ORAL at 23:11

## 2020-10-23 RX ADMIN — ROCURONIUM BROMIDE 10 MG: 10 INJECTION INTRAVENOUS at 16:24

## 2020-10-23 RX ADMIN — SODIUM CHLORIDE, SODIUM LACTATE, POTASSIUM CHLORIDE, AND CALCIUM CHLORIDE: 600; 310; 30; 20 INJECTION, SOLUTION INTRAVENOUS at 12:41

## 2020-10-23 RX ADMIN — Medication 0.2 MG: at 13:49

## 2020-10-23 RX ADMIN — MIDAZOLAM HYDROCHLORIDE 1 MG: 2 INJECTION, SOLUTION INTRAMUSCULAR; INTRAVENOUS at 13:45

## 2020-10-23 RX ADMIN — SODIUM CHLORIDE, SODIUM LACTATE, POTASSIUM CHLORIDE, AND CALCIUM CHLORIDE: 600; 310; 30; 20 INJECTION, SOLUTION INTRAVENOUS at 23:12

## 2020-10-23 RX ADMIN — SODIUM CHLORIDE, SODIUM LACTATE, POTASSIUM CHLORIDE, AND CALCIUM CHLORIDE: 600; 310; 30; 20 INJECTION, SOLUTION INTRAVENOUS at 14:49

## 2020-10-23 RX ADMIN — PROPOFOL 150 MG: 10 INJECTION, EMULSION INTRAVENOUS at 13:51

## 2020-10-23 RX ADMIN — ROCURONIUM BROMIDE 50 MG: 10 INJECTION INTRAVENOUS at 13:57

## 2020-10-23 RX ADMIN — PANTOPRAZOLE SODIUM 40 MG: 40 INJECTION, POWDER, FOR SOLUTION INTRAVENOUS at 23:11

## 2020-10-23 RX ADMIN — HYDROMORPHONE HYDROCHLORIDE 0.5 MG: 2 INJECTION, SOLUTION INTRAMUSCULAR; INTRAVENOUS; SUBCUTANEOUS at 15:27

## 2020-10-23 RX ADMIN — ROCURONIUM BROMIDE 5 MG: 10 INJECTION INTRAVENOUS at 13:51

## 2020-10-23 RX ADMIN — PROPOFOL 50 MG: 10 INJECTION, EMULSION INTRAVENOUS at 16:07

## 2020-10-23 RX ADMIN — SUGAMMADEX 200 MG: 100 INJECTION, SOLUTION INTRAVENOUS at 18:45

## 2020-10-23 RX ADMIN — ONDANSETRON 4 MG: 2 INJECTION INTRAMUSCULAR; INTRAVENOUS at 14:36

## 2020-10-23 RX ADMIN — HYDROMORPHONE HYDROCHLORIDE 0.5 MG: 2 INJECTION, SOLUTION INTRAMUSCULAR; INTRAVENOUS; SUBCUTANEOUS at 18:48

## 2020-10-23 RX ADMIN — ROCURONIUM BROMIDE 40 MG: 10 INJECTION INTRAVENOUS at 17:26

## 2020-10-23 RX ADMIN — SODIUM CHLORIDE, SODIUM LACTATE, POTASSIUM CHLORIDE, AND CALCIUM CHLORIDE: 600; 310; 30; 20 INJECTION, SOLUTION INTRAVENOUS at 14:56

## 2020-10-23 RX ADMIN — ARFORMOTEROL TARTRATE 15 MCG: 15 SOLUTION RESPIRATORY (INHALATION) at 23:13

## 2020-10-23 RX ADMIN — FUROSEMIDE 5 MG: 10 INJECTION, SOLUTION INTRAMUSCULAR; INTRAVENOUS at 15:29

## 2020-10-23 RX ADMIN — ROCURONIUM BROMIDE 20 MG: 10 INJECTION INTRAVENOUS at 15:00

## 2020-10-23 RX ADMIN — SODIUM CHLORIDE, SODIUM LACTATE, POTASSIUM CHLORIDE, AND CALCIUM CHLORIDE: 600; 310; 30; 20 INJECTION, SOLUTION INTRAVENOUS at 12:57

## 2020-10-23 RX ADMIN — CEFAZOLIN SODIUM 2 G: 2 SOLUTION INTRAVENOUS at 13:44

## 2020-10-23 RX ADMIN — MIDAZOLAM HYDROCHLORIDE 1 MG: 2 INJECTION, SOLUTION INTRAMUSCULAR; INTRAVENOUS at 13:49

## 2020-10-23 RX ADMIN — Medication 10 ML: at 23:12

## 2020-10-23 RX ADMIN — SUCCINYLCHOLINE CHLORIDE 140 MG: 20 INJECTION, SOLUTION INTRAMUSCULAR; INTRAVENOUS; PARENTERAL at 13:52

## 2020-10-23 RX ADMIN — HYDROMORPHONE HYDROCHLORIDE 0.5 MG: 2 INJECTION, SOLUTION INTRAMUSCULAR; INTRAVENOUS; SUBCUTANEOUS at 18:00

## 2020-10-23 RX ADMIN — HYDROMORPHONE HYDROCHLORIDE 0.5 MG: 2 INJECTION, SOLUTION INTRAMUSCULAR; INTRAVENOUS; SUBCUTANEOUS at 14:30

## 2020-10-23 RX ADMIN — Medication 10 ML: at 21:38

## 2020-10-23 RX ADMIN — Medication 5 MG: at 23:11

## 2020-10-23 RX ADMIN — HYDROMORPHONE HYDROCHLORIDE 1 MG: 2 INJECTION, SOLUTION INTRAMUSCULAR; INTRAVENOUS; SUBCUTANEOUS at 14:27

## 2020-10-23 RX ADMIN — BUDESONIDE INHALATION 500 MCG: 0.5 SUSPENSION RESPIRATORY (INHALATION) at 23:19

## 2020-10-23 RX ADMIN — HYDROMORPHONE HYDROCHLORIDE 0.5 MG: 1 INJECTION, SOLUTION INTRAMUSCULAR; INTRAVENOUS; SUBCUTANEOUS at 21:37

## 2020-10-23 RX ADMIN — CEFAZOLIN SODIUM 2 G: 2 SOLUTION INTRAVENOUS at 17:44

## 2020-10-23 RX ADMIN — SODIUM CHLORIDE, SODIUM LACTATE, POTASSIUM CHLORIDE, AND CALCIUM CHLORIDE: 600; 310; 30; 20 INJECTION, SOLUTION INTRAVENOUS at 14:10

## 2020-10-23 RX ADMIN — SODIUM CHLORIDE, SODIUM LACTATE, POTASSIUM CHLORIDE, AND CALCIUM CHLORIDE: 600; 310; 30; 20 INJECTION, SOLUTION INTRAVENOUS at 20:57

## 2020-10-23 RX ADMIN — ROCURONIUM BROMIDE 10 MG: 10 INJECTION INTRAVENOUS at 15:46

## 2020-10-23 RX ADMIN — DEXAMETHASONE SODIUM PHOSPHATE 4 MG: 4 INJECTION, SOLUTION INTRAMUSCULAR; INTRAVENOUS at 14:36

## 2020-10-23 RX ADMIN — FENTANYL CITRATE 50 MCG: 50 INJECTION INTRAMUSCULAR; INTRAVENOUS at 13:49

## 2020-10-23 RX ADMIN — LIDOCAINE HYDROCHLORIDE 50 MG: 20 INJECTION, SOLUTION INTRAVENOUS at 13:50

## 2020-10-23 RX ADMIN — HEPARIN SODIUM 5000 UNITS: 5000 INJECTION INTRAVENOUS; SUBCUTANEOUS at 12:58

## 2020-10-23 ASSESSMENT — PULMONARY FUNCTION TESTS
PIF_VALUE: 3
PIF_VALUE: 33
PIF_VALUE: 30
PIF_VALUE: 29
PIF_VALUE: 23
PIF_VALUE: 29
PIF_VALUE: 27
PIF_VALUE: 31
PIF_VALUE: 32
PIF_VALUE: 25
PIF_VALUE: 27
PIF_VALUE: 28
PIF_VALUE: 31
PIF_VALUE: 26
PIF_VALUE: 32
PIF_VALUE: 23
PIF_VALUE: 29
PIF_VALUE: 33
PIF_VALUE: 31
PIF_VALUE: 31
PIF_VALUE: 34
PIF_VALUE: 26
PIF_VALUE: 30
PIF_VALUE: 27
PIF_VALUE: 27
PIF_VALUE: 30
PIF_VALUE: 28
PIF_VALUE: 31
PIF_VALUE: 23
PIF_VALUE: 32
PIF_VALUE: 28
PIF_VALUE: 32
PIF_VALUE: 1
PIF_VALUE: 27
PIF_VALUE: 31
PIF_VALUE: 29
PIF_VALUE: 29
PIF_VALUE: 27
PIF_VALUE: 26
PIF_VALUE: 27
PIF_VALUE: 34
PIF_VALUE: 21
PIF_VALUE: 32
PIF_VALUE: 33
PIF_VALUE: 29
PIF_VALUE: 31
PIF_VALUE: 34
PIF_VALUE: 29
PIF_VALUE: 29
PIF_VALUE: 26
PIF_VALUE: 29
PIF_VALUE: 34
PIF_VALUE: 31
PIF_VALUE: 29
PIF_VALUE: 26
PIF_VALUE: 29
PIF_VALUE: 31
PIF_VALUE: 31
PIF_VALUE: 27
PIF_VALUE: 23
PIF_VALUE: 31
PIF_VALUE: 26
PIF_VALUE: 28
PIF_VALUE: 29
PIF_VALUE: 28
PIF_VALUE: 22
PIF_VALUE: 26
PIF_VALUE: 26
PIF_VALUE: 29
PIF_VALUE: 25
PIF_VALUE: 31
PIF_VALUE: 26
PIF_VALUE: 28
PIF_VALUE: 24
PIF_VALUE: 30
PIF_VALUE: 27
PIF_VALUE: 28
PIF_VALUE: 27
PIF_VALUE: 25
PIF_VALUE: 26
PIF_VALUE: 24
PIF_VALUE: 1
PIF_VALUE: 32
PIF_VALUE: 33
PIF_VALUE: 28
PIF_VALUE: 29
PIF_VALUE: 28
PIF_VALUE: 27
PIF_VALUE: 23
PIF_VALUE: 26
PIF_VALUE: 27
PIF_VALUE: 23
PIF_VALUE: 28
PIF_VALUE: 26
PIF_VALUE: 28
PIF_VALUE: 29
PIF_VALUE: 28
PIF_VALUE: 31
PIF_VALUE: 30
PIF_VALUE: 34
PIF_VALUE: 27
PIF_VALUE: 29
PIF_VALUE: 28
PIF_VALUE: 27
PIF_VALUE: 26
PIF_VALUE: 24
PIF_VALUE: 28
PIF_VALUE: 34
PIF_VALUE: 31
PIF_VALUE: 31
PIF_VALUE: 29
PIF_VALUE: 30
PIF_VALUE: 27
PIF_VALUE: 28
PIF_VALUE: 31
PIF_VALUE: 33
PIF_VALUE: 27
PIF_VALUE: 29
PIF_VALUE: 29
PIF_VALUE: 28
PIF_VALUE: 31
PIF_VALUE: 28
PIF_VALUE: 23
PIF_VALUE: 26
PIF_VALUE: 29
PIF_VALUE: 28
PIF_VALUE: 28
PIF_VALUE: 29
PIF_VALUE: 33
PIF_VALUE: 34
PIF_VALUE: 28
PIF_VALUE: 25
PIF_VALUE: 24
PIF_VALUE: 29
PIF_VALUE: 26
PIF_VALUE: 24
PIF_VALUE: 27
PIF_VALUE: 29
PIF_VALUE: 27
PIF_VALUE: 26
PIF_VALUE: 23
PIF_VALUE: 30
PIF_VALUE: 31
PIF_VALUE: 23
PIF_VALUE: 28
PIF_VALUE: 29
PIF_VALUE: 33
PIF_VALUE: 34
PIF_VALUE: 28
PIF_VALUE: 27
PIF_VALUE: 28
PIF_VALUE: 28
PIF_VALUE: 25
PIF_VALUE: 25
PIF_VALUE: 31
PIF_VALUE: 24
PIF_VALUE: 28
PIF_VALUE: 5
PIF_VALUE: 25
PIF_VALUE: 33
PIF_VALUE: 31
PIF_VALUE: 33
PIF_VALUE: 26
PIF_VALUE: 27
PIF_VALUE: 28
PIF_VALUE: 27
PIF_VALUE: 34
PIF_VALUE: 29
PIF_VALUE: 31
PIF_VALUE: 30
PIF_VALUE: 28
PIF_VALUE: 23
PIF_VALUE: 33
PIF_VALUE: 26
PIF_VALUE: 34
PIF_VALUE: 32
PIF_VALUE: 30
PIF_VALUE: 27
PIF_VALUE: 33
PIF_VALUE: 26
PIF_VALUE: 29
PIF_VALUE: 9
PIF_VALUE: 27
PIF_VALUE: 29
PIF_VALUE: 25
PIF_VALUE: 26
PIF_VALUE: 26
PIF_VALUE: 30
PIF_VALUE: 28
PIF_VALUE: 28
PIF_VALUE: 21
PIF_VALUE: 31
PIF_VALUE: 3
PIF_VALUE: 1
PIF_VALUE: 34
PIF_VALUE: 31
PIF_VALUE: 21
PIF_VALUE: 31
PIF_VALUE: 28
PIF_VALUE: 0
PIF_VALUE: 33
PIF_VALUE: 28
PIF_VALUE: 3
PIF_VALUE: 34
PIF_VALUE: 33
PIF_VALUE: 29
PIF_VALUE: 28
PIF_VALUE: 20
PIF_VALUE: 32
PIF_VALUE: 28
PIF_VALUE: 26
PIF_VALUE: 28
PIF_VALUE: 33
PIF_VALUE: 31
PIF_VALUE: 33
PIF_VALUE: 31
PIF_VALUE: 29
PIF_VALUE: 24
PIF_VALUE: 28
PIF_VALUE: 28
PIF_VALUE: 33
PIF_VALUE: 27
PIF_VALUE: 27
PIF_VALUE: 30
PIF_VALUE: 30
PIF_VALUE: 26
PIF_VALUE: 25
PIF_VALUE: 31
PIF_VALUE: 1
PIF_VALUE: 28
PIF_VALUE: 35
PIF_VALUE: 31
PIF_VALUE: 31
PIF_VALUE: 28
PIF_VALUE: 29
PIF_VALUE: 34
PIF_VALUE: 29
PIF_VALUE: 33
PIF_VALUE: 24
PIF_VALUE: 27
PIF_VALUE: 23
PIF_VALUE: 27
PIF_VALUE: 27
PIF_VALUE: 29
PIF_VALUE: 25
PIF_VALUE: 28
PIF_VALUE: 33
PIF_VALUE: 34
PIF_VALUE: 28
PIF_VALUE: 31
PIF_VALUE: 31
PIF_VALUE: 26
PIF_VALUE: 27
PIF_VALUE: 26
PIF_VALUE: 27
PIF_VALUE: 28
PIF_VALUE: 25
PIF_VALUE: 28
PIF_VALUE: 5
PIF_VALUE: 2
PIF_VALUE: 30
PIF_VALUE: 32
PIF_VALUE: 26
PIF_VALUE: 21
PIF_VALUE: 33
PIF_VALUE: 34
PIF_VALUE: 33
PIF_VALUE: 26
PIF_VALUE: 28
PIF_VALUE: 31
PIF_VALUE: 23
PIF_VALUE: 23
PIF_VALUE: 28
PIF_VALUE: 33
PIF_VALUE: 26
PIF_VALUE: 31
PIF_VALUE: 27
PIF_VALUE: 27
PIF_VALUE: 28
PIF_VALUE: 5
PIF_VALUE: 31
PIF_VALUE: 29
PIF_VALUE: 28
PIF_VALUE: 3
PIF_VALUE: 31
PIF_VALUE: 21
PIF_VALUE: 34
PIF_VALUE: 2
PIF_VALUE: 31
PIF_VALUE: 27
PIF_VALUE: 24
PIF_VALUE: 40
PIF_VALUE: 26
PIF_VALUE: 28
PIF_VALUE: 26
PIF_VALUE: 27
PIF_VALUE: 34
PIF_VALUE: 31
PIF_VALUE: 33
PIF_VALUE: 31
PIF_VALUE: 27
PIF_VALUE: 27
PIF_VALUE: 25
PIF_VALUE: 26
PIF_VALUE: 25
PIF_VALUE: 30

## 2020-10-23 ASSESSMENT — PAIN DESCRIPTION - DESCRIPTORS
DESCRIPTORS: DISCOMFORT
DESCRIPTORS: DISCOMFORT

## 2020-10-23 ASSESSMENT — PAIN SCALES - GENERAL
PAINLEVEL_OUTOF10: 0
PAINLEVEL_OUTOF10: 7
PAINLEVEL_OUTOF10: 5

## 2020-10-23 ASSESSMENT — PAIN DESCRIPTION - LOCATION
LOCATION: ABDOMEN
LOCATION: ABDOMEN

## 2020-10-23 ASSESSMENT — PAIN DESCRIPTION - ORIENTATION
ORIENTATION: RIGHT;LEFT;MID
ORIENTATION: MID;RIGHT;LEFT

## 2020-10-23 ASSESSMENT — PAIN DESCRIPTION - PROGRESSION
CLINICAL_PROGRESSION: GRADUALLY WORSENING
CLINICAL_PROGRESSION: GRADUALLY IMPROVING

## 2020-10-23 ASSESSMENT — PAIN - FUNCTIONAL ASSESSMENT
PAIN_FUNCTIONAL_ASSESSMENT: PREVENTS OR INTERFERES SOME ACTIVE ACTIVITIES AND ADLS
PAIN_FUNCTIONAL_ASSESSMENT: PREVENTS OR INTERFERES SOME ACTIVE ACTIVITIES AND ADLS

## 2020-10-23 ASSESSMENT — PAIN DESCRIPTION - PAIN TYPE
TYPE: SURGICAL PAIN
TYPE: SURGICAL PAIN

## 2020-10-23 ASSESSMENT — PAIN DESCRIPTION - FREQUENCY
FREQUENCY: CONTINUOUS
FREQUENCY: INTERMITTENT

## 2020-10-23 ASSESSMENT — PAIN DESCRIPTION - ONSET
ONSET: ON-GOING
ONSET: GRADUAL

## 2020-10-23 NOTE — OP NOTE
Operative Note          Date: 10/23/20    Patient: Jorge Gomez    Surgeon: Jessica Aguilera MD    Procedure: cystoscopy with urethral dilation for bladder neck contracture, complex catheter insertion    Drains: 16 fr coude catheter    IVF: MITZY    EBL: minimal    COMPLICATIONS: None identified intraop    Indications: I was called into the OR to evaluate anuria and difficult to irrigate catheter. Details of the procedure: On initial evaluation, the patient has a significant history of urologic surgery including turp and bladder diverticulectomy. A catheter is in place with no urine output. The catheter is removed after deflating the balloon. There is bleeding per urethra. Procedure was begun with a 16 Grenadian flexible cystoscope via urethra into the bladder. The urethra demonstrated an irritated appearance in the bulbar urethra consistent with likely dove trauma. The scope is advanced to the prostatic fossa which is open leading to a ~12 fr opening at the bladder neck. I am unable to optically dilate this. I pass a wire over the scope beyond this area into the presumed bladder. A dilator kit is used to progressively dilate the urethra up to 22 Nigerian. There is dense tissue at the site of bladder neck contracture but this opens. Clear urine returns. Next the wires are removed and a 16 fr coude catheter is placed with ease into the bladder with return of > 1 L of blood tinged urine. 10 cc are placed in the balloon. Dispo: the patient will keep the catheter for 5-7 days. We will see him tomorrow.

## 2020-10-23 NOTE — PROGRESS NOTES
Patient admitted to PACU # 15 from OR at 1858 post ROBOTIC RECURRENT INCARCERATED, INCISIONAL HERNIA REPAIR WITH MESH, BILATERAL POSTERIOR COMPONENT SEPARATION; CYSTOSCOPY, FLEXIBLE CYSTOSCOPY, URETHRAL DILATATION, 16 FR COUDE CATHETER PLACEMENT per Dr. Amy Fan. Attached to PACU monitoring system and report received from anesthesia provider. Patient was reported to be hemodynamically stable during procedure. Patient unresponsive with oral airway on arrival, not showing any s/sx of pain currently.

## 2020-10-23 NOTE — ANESTHESIA POSTPROCEDURE EVALUATION
Department of Anesthesiology  Postprocedure Note    Patient: Henri Haley  MRN: [id number]  YOB: 1950  Date of evaluation: 10/23/2020  Time:  6:49 PM     Procedure Summary     Date:  10/23/20 Room / Location:  [address] [place] [address]    Anesthesia Start:  7593 Anesthesia Stop:      Procedure:  ROBOTIC RECURRENT INCARCERATED, INCISIONAL  HERNIA REPAIR WITH MESH, BILATERAL POSTERIOR COMPONENT SEPARATION;  CYSTOSCOPY, FLEXIBLE CYSTOSCOPY, URETHRAL DILATATION, 16 FR COUDE CATHETER PLACEMENT (N/A ) Diagnosis:       Recurrent incisional hernia with incarceration      (Recurrent incisional hernia with incarceration [K43.0])    Surgeon: Margaret Jorge DO Responsible Provider:  Megan Ash MD    Anesthesia Type:  general ASA Status:  2          Anesthesia Type: general    Mary Phase I: Mary Score: 10    Mary Phase II:      Last vitals: Reviewed and per EMR flowsheets.        Anesthesia Post Evaluation    Patient location during evaluation: PACU  Patient participation: complete - patient participated  Level of consciousness: awake and alert  Pain score: 0  Airway patency: patent  Nausea & Vomiting: no nausea and no vomiting  Complications: no  Cardiovascular status: hemodynamically stable  Respiratory status: acceptable  Hydration status: euvolemic

## 2020-10-23 NOTE — H&P
Villa Goldmann    [id number]    [facility] Same Day Surgery Update H & P  Department of General Surgery   Surgical Service   Pre-operative History and Physical  Last H & P within the last 30 days. DIAGNOSIS:   Recurrent incisional hernia with incarceration [K43.0]    Procedure(s):  ROBOTIC RECURRENT INCARCERATED, INCISIONAL  HERNIA REPAIR WITH BILATERAL POSTERIOR COMPONENT SEPARATION     HISTORY OF PRESENT ILLNESS:   Patient with recurrent incisional hernia presents today for repair. Covid 19:  Patient denies fever, chills, cough or known exposure to Covid-19.        Past Medical History:        Diagnosis Date    [facility]F) [facility] [address]     Asthma     BPH (benign prostatic hyperplasia)     Diabetes mellitus, type 2 ([facility] [address]     Hypertension      Past Surgical History:        Procedure Laterality Date    APPENDECTOMY      COLONOSCOPY      CYSTOSCOPY      CYSTOSCOPY  07/26/2017    cysto, bilateral stent exchange, left retrograde pyelogram    HERNIA REPAIR      x 4    OTHER SURGICAL HISTORY      bladder diverticuli excision    TURP       Past Social History:  Social History     Socioeconomic History    Marital status:      Spouse name: None    Number of children: 3    Years of education: None    Highest education level: None   Occupational History    Occupation: retired   Social Needs    Financial resource strain: None    Food insecurity     Worry: None     Inability: None    Transportation needs     Medical: None     Non-medical: None   Tobacco Use    Smoking status: Never Smoker    Smokeless tobacco: Never Used   Substance and Sexual Activity    Alcohol use: Yes     Comment: occasionally    Drug use: None    Sexual activity: None   Lifestyle    Physical activity     Days per week: None     Minutes per session: None    Stress: None   Relationships    Social connections     Talks on phone: None     Gets together: None     Attends Hindu service: None     Active member of club or organization: None     Attends meetings of clubs or organizations: None     Relationship status: None    Intimate partner violence     Fear of current or ex partner: None     Emotionally abused: None     Physically abused: None     Forced sexual activity: None   Other Topics Concern    None   Social History Narrative    None         Medications Prior to Admission:      Prior to Admission medications    Medication Sig Start Date End Date Taking?  Authorizing Provider   metFORMIN (GLUCOPHAGE) 500 MG tablet Take 2 tablets by mouth 2 times daily (with meals) Resume on 10/18/2020 10/18/20  Yes Kassy Vazquez MD   aspirin EC 81 MG EC tablet Take 1 tablet by mouth daily Start on 10/18/2020 10/18/20  Yes Kassy Vazquez MD   atorvastatin (LIPITOR) 10 MG tablet Take 1 tablet by mouth every evening 10/15/20  Yes aKssy Vazquez MD   Acetaminophen (TYLENOL ARTHRITIS PAIN PO) Take by mouth   Yes Historical Provider, MD   omeprazole (PRILOSEC) 40 MG delayed release capsule Take 1 capsule by mouth daily 7/15/20  Yes Michael Pandey DO   hydroCHLOROthiazide (MICROZIDE) 12.5 MG capsule Take 12.5 mg by mouth daily   Yes Historical Provider, MD   losartan (COZAAR) 50 MG tablet Take 50 mg by mouth Take half pill daily   Yes Historical Provider, MD   tamsulosin (FLOMAX) 0.4 MG capsule Take 0.4 mg by mouth daily   Yes Historical Provider, MD   Multiple Vitamins-Minerals (THERAPEUTIC MULTIVITAMIN-MINERALS) tablet Take 1 tablet by mouth daily   Yes Historical Provider, MD   b complex vitamins capsule Take 1 capsule by mouth daily   Yes Historical Provider, MD   vitamin D 1000 units CAPS Take by mouth   Yes Historical Provider, MD   latanoprost (XALATAN) 0.005 % ophthalmic solution 1 drop nightly   Yes Historical Provider, MD   melatonin 5 MG TABS tablet Take 5 mg by mouth nightly as needed   Yes Historical Provider, MD   glipiZIDE (GLUCOTROL) 10 MG tablet Take 10 mg by mouth 4 times daily   Yes Historical Provider, MD cyclobenzaprine (FLEXERIL) 10 MG tablet Take 5 mg by mouth nightly    Yes Historical Provider, MD   budesonide-formoterol (SYMBICORT) 160-4.5 MCG/ACT AERO Inhale 2 puffs into the lungs 2 times daily 7/19/17  Yes Maricarmen Poole MD   albuterol sulfate HFA (PROAIR HFA) 108 (90 Base) MCG/ACT inhaler Inhale 2 puffs into the lungs every 6 hours as needed for Wheezing 7/19/17  Yes Maricarmen Poole MD   docusate sodium (COLACE) 100 MG capsule Take 1 capsule by mouth 2 times daily 7/19/17  Yes Maricarmen Poole MD         Allergies:  Patient has no known allergies. PHYSICAL EXAM:      /76   Pulse 66   Temp 97.8 °F (36.6 °C) (Oral)   Resp 16   SpO2 97%      Airway:  Airway patent with no audible stridor    Heart:  regular rate and rhythm, No murmur noted    Lungs:  No increased work of breathing, good air exchange, clear to auscultation bilaterally, no crackles or wheezing    Abdomen:  soft, non-distended, non-tender, no rebound tenderness or guarding, normal active bowel sounds and no masses palpated    ASSESSMENT AND PLAN     Patient is a 78 y.o. male with above specified procedure planned. 1.  Patient seen and focused exam done today- no new changes since last physical exam on 9/24/20. Patient underwent heart cath on 10/15/20 and was cleared for surgery. 2.  Access to ancillary services are available per request of the provider.     TORITO Ocamop - CNP     10/23/2020

## 2020-10-23 NOTE — BRIEF OP NOTE
Brief Postoperative Note      Patient: Ni Li  YOB: 1950  MRN: [id number]    Date of Procedure: 10/23/2020    Pre-Op Diagnosis: Recurrent incisional hernia with incarceration [K43.0]    Post-Op Diagnosis: Same       Procedure(s):  ROBOTIC RECURRENT INCARCERATED, INCISIONAL  HERNIA REPAIR WITH MESH, BILATERAL POSTERIOR COMPONENT SEPARATION;  CYSTOSCOPY, FLEXIBLE CYSTOSCOPY, URETHRAL DILATATION, 16 FR COUDE CATHETER PLACEMENT    Surgeon(s):   Kenyatta Dallas, [address] Fan MD    Assistant:  Surgical Assistant: Marina Garcia  Resident: Caity Benítez MD    Anesthesia: General    Estimated Blood Loss (mL): less than 50     Complications: None    Specimens:   ID Type Source Tests Collected by Time Destination   A : INCARCERATED HERNIA  [facility] Tissue Tissue SURGICAL PATHOLOGY Kenyatta Dallas DO 10/23/2020 1818        Implants:  * No implants in log *      Drains:   Urethral Catheter Coude 16 fr (Active)       [REMOVED] Urethral Catheter Coude 14 fr (Removed)       [REMOVED] Ureteral Catheter 18 fr (Removed)       Findings: see op note    Electronically signed by Caity Benítez MD on 10/23/2020 at 6:42 PM

## 2020-10-23 NOTE — ANESTHESIA PRE PROCEDURE
Department of Anesthesiology  Preprocedure Note       Name:  Pastor Coats   Age:  78 y.o.  :  1950                                          MRN:  [id number]         Date:  10/23/2020      Surgeon: Rachael Arredondo): Brandi Mark DO    Procedure: Procedure(s):  ROBOTIC RECURRENT INCARCERATED, INCISIONAL  HERNIA REPAIR WITH BILATERAL POSTERIOR COMPONENT SEPARATION    Medications prior to admission:   Prior to Admission medications    Medication Sig Start Date End Date Taking?  Authorizing Provider   metFORMIN (GLUCOPHAGE) 500 MG tablet Take 2 tablets by mouth 2 times daily (with meals) Resume on 10/18/2020 10/18/20  Yes Gutierrez Trevizo MD   aspirin EC 81 MG EC tablet Take 1 tablet by mouth daily Start on 10/18/2020 10/18/20  Yes Gutierrez Trevizo MD   atorvastatin (LIPITOR) 10 MG tablet Take 1 tablet by mouth every evening 10/15/20  Yes Gutierrez Trevizo MD   Acetaminophen (TYLENOL ARTHRITIS PAIN PO) Take by mouth   Yes Historical Provider, MD   omeprazole (PRILOSEC) 40 MG delayed release capsule Take 1 capsule by mouth daily 7/15/20  Yes Brandi Mark DO   hydroCHLOROthiazide (MICROZIDE) 12.5 MG capsule Take 12.5 mg by mouth daily   Yes Historical Provider, MD   losartan (COZAAR) 50 MG tablet Take 50 mg by mouth Take half pill daily   Yes Historical Provider, MD   tamsulosin (FLOMAX) 0.4 MG capsule Take 0.4 mg by mouth daily   Yes Historical Provider, MD   Multiple Vitamins-Minerals (THERAPEUTIC MULTIVITAMIN-MINERALS) tablet Take 1 tablet by mouth daily   Yes Historical Provider, MD   b complex vitamins capsule Take 1 capsule by mouth daily   Yes Historical Provider, MD   vitamin D 1000 units CAPS Take by mouth   Yes Historical Provider, MD   latanoprost (XALATAN) 0.005 % ophthalmic solution 1 drop nightly   Yes Historical Provider, MD   melatonin 5 MG TABS tablet Take 5 mg by mouth nightly as needed   Yes Historical Provider, MD   glipiZIDE (GLUCOTROL) 10 MG tablet Take 10 mg by mouth 4 times daily   Yes Historical Provider, MD   cyclobenzaprine (FLEXERIL) 10 MG tablet Take 5 mg by mouth nightly    Yes Historical Provider, MD   budesonide-formoterol (SYMBICORT) 160-4.5 MCG/ACT AERO Inhale 2 puffs into the lungs 2 times daily 7/19/17  Yes Jalil Neal MD   albuterol sulfate HFA (PROAIR HFA) 108 (90 Base) MCG/ACT inhaler Inhale 2 puffs into the lungs every 6 hours as needed for Wheezing 7/19/17  Yes Jalil Neal MD   docusate sodium (COLACE) 100 MG capsule Take 1 capsule by mouth 2 times daily 7/19/17  Yes Jalil Neal MD       Current medications:    Current Facility-Administered Medications   Medication Dose Route Frequency Provider Last Rate Last Dose    ceFAZolin (ANCEF) 2 g in dextrose 3 % 50 mL IVPB (duplex)  2 g Intravenous Once Andrew Palencia, DO        heparin (porcine) injection 5,000 Units  5,000 Units Subcutaneous Once Andrew Palencia, DO        lactated ringers infusion   Intravenous Continuous [place], DO        lactated ringers infusion   Intravenous Continuous Ledy Nash MD        sodium chloride flush 0.9 % injection 10 mL  10 mL Intravenous 2 times per day Ledy Nash MD        sodium chloride flush 0.9 % injection 10 mL  10 mL Intravenous PRN Ledy Nash MD        lidocaine PF 1 % injection 1 mL  1 mL Intradermal Once PRN Ledy Nash MD           Allergies:  No Known Allergies    Problem List:    Patient Active Problem List   Diagnosis Code    Asthma J45.909    BPH (benign prostatic hyperplasia) N40.0    Diabetes mellitus, type 2 ([address] E11.9    Hypertension I10    Primary insomnia F51.01    Abdominal wall hernia K43.9       Past Medical History:        Diagnosis Date    Acute renal failure (ARF) ([address]     Asthma     BPH (benign prostatic hyperplasia)     Diabetes mellitus, type 2 ([address]     Hypertension        Past Surgical History:        Procedure Laterality Date    APPENDECTOMY      COLONOSCOPY      CYSTOSCOPY      CYSTOSCOPY  07/26/2017    cysto, bilateral stent exchange, left retrograde pyelogram    HERNIA REPAIR      x 4    OTHER SURGICAL HISTORY      bladder diverticuli excision    TURP         Social History:    Social History     Tobacco Use    Smoking status: Never Smoker    Smokeless tobacco: Never Used   Substance Use Topics    Alcohol use: Yes     Comment: occasionally                                Counseling given: Not Answered      Vital Signs (Current): There were no vitals filed for this visit. BP Readings from Last 3 Encounters:   10/02/20 92/60   09/24/20 122/68   08/26/20 (!) 140/80       NPO Status:                                                                                 BMI:   Wt Readings from Last 3 Encounters:   10/15/20 208 lb (94.3 kg)   10/02/20 203 lb 3.2 oz (92.2 kg)   09/24/20 208 lb (94.3 kg)     There is no height or weight on file to calculate BMI.    CBC:   Lab Results   Component Value Date    WBC 7.0 10/15/2020    RBC 5.29 10/15/2020    HGB 15.7 10/15/2020    HCT 46.5 10/15/2020    MCV 87.9 10/15/2020    RDW 14.3 10/15/2020     10/15/2020       CMP:   Lab Results   Component Value Date     10/15/2020    K 5.1 10/15/2020    CL 97 10/15/2020    CO2 25 10/15/2020    BUN 28 10/15/2020    CREATININE 1.1 10/15/2020    GFRAA >60 10/15/2020    AGRATIO 1.2 10/12/2020    LABGLOM >60 10/15/2020    GLUCOSE 184 10/15/2020    PROT 7.5 10/12/2020    CALCIUM 9.2 10/15/2020    BILITOT 0.3 10/12/2020    ALKPHOS 73 10/12/2020    AST 20 10/12/2020    ALT 14 10/12/2020       POC Tests: No results for input(s): POCGLU, POCNA, POCK, POCCL, POCBUN, POCHEMO, POCHCT in the last 72 hours. Coags:   Lab Results   Component Value Date    PROTIME 11.6 10/15/2020    INR 1.00 10/15/2020       HCG (If Applicable): No results found for: PREGTESTUR, PREGSERUM, HCG, HCGQUANT     ABGs: No results found for: PHART, PO2ART, DWF6YXH, XFD5DHZ, BEART, C9JGJMHD     Type & Screen (If Applicable):   No results found for: Gearl Sol    Drug/Infectious Status (If Applicable):  No results found for: HIV, HEPCAB    COVID-19 Screening (If Applicable):   Lab Results   Component Value Date    COVID19 NOT DETECTED 10/19/2020    COVID19 Not Detected 10/12/2020         Anesthesia Evaluation  Patient summary reviewed history of anesthetic complications (hx of difficult intubation in the past last 3 yrs ago ):   Airway: Mallampati: III  TM distance: >3 FB   Neck ROM: full  Mouth opening: > = 3 FB Dental: normal exam         Pulmonary:   (+) asthma:                            Cardiovascular:    (+) hypertension:,                ROS comment: Pt had a cardiac cath following an inconclusive stress test last week. The cath demonstrated some minimal plaque and did not require  A PTCA or stent. Neuro/Psych:               GI/Hepatic/Renal:        Renal disease: BPH. Endo/Other:    (+) Diabetes, : arthritis: OA., .                 Abdominal:           Vascular:                                        Anesthesia Plan      general     ASA 2       Induction: intravenous. Anesthetic plan and risks discussed with patient.                       Antoinette Lee MD   10/23/2020

## 2020-10-24 PROBLEM — K66.0 PERITONEAL ADHESIONS: Status: ACTIVE | Noted: 2020-10-24

## 2020-10-24 LAB
ALBUMIN SERPL-MCNC: 3.3 G/DL (ref 3.4–5)
ALBUMIN SERPL-MCNC: 3.5 G/DL (ref 3.4–5)
ANION GAP SERPL CALCULATED.3IONS-SCNC: 12 MMOL/L (ref 3–16)
ANION GAP SERPL CALCULATED.3IONS-SCNC: 15 MMOL/L (ref 3–16)
BASOPHILS ABSOLUTE: 0.1 K/UL (ref 0–0.2)
BASOPHILS RELATIVE PERCENT: 0.5 %
BUN BLDV-MCNC: 26 MG/DL (ref 7–20)
BUN BLDV-MCNC: 27 MG/DL (ref 7–20)
CALCIUM SERPL-MCNC: 7.9 MG/DL (ref 8.3–10.6)
CALCIUM SERPL-MCNC: 8.4 MG/DL (ref 8.3–10.6)
CHLORIDE BLD-SCNC: 100 MMOL/L (ref 99–110)
CHLORIDE BLD-SCNC: 98 MMOL/L (ref 99–110)
CO2: 21 MMOL/L (ref 21–32)
CO2: 22 MMOL/L (ref 21–32)
CREAT SERPL-MCNC: 1.4 MG/DL (ref 0.8–1.3)
CREAT SERPL-MCNC: 1.6 MG/DL (ref 0.8–1.3)
EOSINOPHILS ABSOLUTE: 0 K/UL (ref 0–0.6)
EOSINOPHILS RELATIVE PERCENT: 0 %
GFR AFRICAN AMERICAN: 52
GFR AFRICAN AMERICAN: >60
GFR NON-AFRICAN AMERICAN: 43
GFR NON-AFRICAN AMERICAN: 50
GLUCOSE BLD-MCNC: 173 MG/DL (ref 70–99)
GLUCOSE BLD-MCNC: 189 MG/DL (ref 70–99)
GLUCOSE BLD-MCNC: 191 MG/DL (ref 70–99)
GLUCOSE BLD-MCNC: 202 MG/DL (ref 70–99)
GLUCOSE BLD-MCNC: 217 MG/DL (ref 70–99)
HCT VFR BLD CALC: 37.1 % (ref 40.5–52.5)
HEMOGLOBIN: 12.3 G/DL (ref 13.5–17.5)
LYMPHOCYTES ABSOLUTE: 0.9 K/UL (ref 1–5.1)
LYMPHOCYTES RELATIVE PERCENT: 8.2 %
MAGNESIUM: 1.4 MG/DL (ref 1.8–2.4)
MCH RBC QN AUTO: 29.9 PG (ref 26–34)
MCHC RBC AUTO-ENTMCNC: 33.3 G/DL (ref 31–36)
MCV RBC AUTO: 90 FL (ref 80–100)
MONOCYTES ABSOLUTE: 1 K/UL (ref 0–1.3)
MONOCYTES RELATIVE PERCENT: 9.2 %
NEUTROPHILS ABSOLUTE: 9.1 K/UL (ref 1.7–7.7)
NEUTROPHILS RELATIVE PERCENT: 82.1 %
PDW BLD-RTO: 14 % (ref 12.4–15.4)
PERFORMED ON: ABNORMAL
PHOSPHORUS: 3.5 MG/DL (ref 2.5–4.9)
PHOSPHORUS: 3.8 MG/DL (ref 2.5–4.9)
PLATELET # BLD: 243 K/UL (ref 135–450)
PMV BLD AUTO: 7.4 FL (ref 5–10.5)
POTASSIUM SERPL-SCNC: 4.2 MMOL/L (ref 3.5–5.1)
POTASSIUM SERPL-SCNC: 4.6 MMOL/L (ref 3.5–5.1)
RBC # BLD: 4.12 M/UL (ref 4.2–5.9)
SODIUM BLD-SCNC: 134 MMOL/L (ref 136–145)
SODIUM BLD-SCNC: 134 MMOL/L (ref 136–145)
WBC # BLD: 11.1 K/UL (ref 4–11)

## 2020-10-24 PROCEDURE — 6360000002 HC RX W HCPCS: Performed by: STUDENT IN AN ORGANIZED HEALTH CARE EDUCATION/TRAINING PROGRAM

## 2020-10-24 PROCEDURE — 6370000000 HC RX 637 (ALT 250 FOR IP): Performed by: STUDENT IN AN ORGANIZED HEALTH CARE EDUCATION/TRAINING PROGRAM

## 2020-10-24 PROCEDURE — 2700000000 HC OXYGEN THERAPY PER DAY

## 2020-10-24 PROCEDURE — 1200000000 HC SEMI PRIVATE

## 2020-10-24 PROCEDURE — 85025 COMPLETE CBC W/AUTO DIFF WBC: CPT

## 2020-10-24 PROCEDURE — 2580000003 HC RX 258: Performed by: STUDENT IN AN ORGANIZED HEALTH CARE EDUCATION/TRAINING PROGRAM

## 2020-10-24 PROCEDURE — 94669 MECHANICAL CHEST WALL OSCILL: CPT

## 2020-10-24 PROCEDURE — 83735 ASSAY OF MAGNESIUM: CPT

## 2020-10-24 PROCEDURE — 94150 VITAL CAPACITY TEST: CPT

## 2020-10-24 PROCEDURE — 51798 US URINE CAPACITY MEASURE: CPT

## 2020-10-24 PROCEDURE — 80069 RENAL FUNCTION PANEL: CPT

## 2020-10-24 PROCEDURE — 94761 N-INVAS EAR/PLS OXIMETRY MLT: CPT

## 2020-10-24 PROCEDURE — 36415 COLL VENOUS BLD VENIPUNCTURE: CPT

## 2020-10-24 PROCEDURE — 94640 AIRWAY INHALATION TREATMENT: CPT

## 2020-10-24 RX ORDER — OXYCODONE HYDROCHLORIDE 5 MG/1
5 TABLET ORAL EVERY 6 HOURS PRN
Qty: 28 TABLET | Refills: 0 | Status: SHIPPED | OUTPATIENT
Start: 2020-10-24 | End: 2020-10-31

## 2020-10-24 RX ORDER — DEXTROSE MONOHYDRATE 25 G/50ML
12.5 INJECTION, SOLUTION INTRAVENOUS PRN
Status: DISCONTINUED | OUTPATIENT
Start: 2020-10-24 | End: 2020-10-26 | Stop reason: HOSPADM

## 2020-10-24 RX ORDER — MAGNESIUM SULFATE IN WATER 40 MG/ML
4 INJECTION, SOLUTION INTRAVENOUS ONCE
Status: COMPLETED | OUTPATIENT
Start: 2020-10-24 | End: 2020-10-24

## 2020-10-24 RX ORDER — DEXTROSE MONOHYDRATE 50 MG/ML
100 INJECTION, SOLUTION INTRAVENOUS PRN
Status: DISCONTINUED | OUTPATIENT
Start: 2020-10-24 | End: 2020-10-26 | Stop reason: HOSPADM

## 2020-10-24 RX ORDER — SODIUM CHLORIDE 9 MG/ML
INJECTION, SOLUTION INTRAVENOUS CONTINUOUS
Status: DISCONTINUED | OUTPATIENT
Start: 2020-10-24 | End: 2020-10-26

## 2020-10-24 RX ORDER — PANTOPRAZOLE SODIUM 40 MG/1
40 TABLET, DELAYED RELEASE ORAL
Status: DISCONTINUED | OUTPATIENT
Start: 2020-10-24 | End: 2020-10-26 | Stop reason: HOSPADM

## 2020-10-24 RX ORDER — HEPARIN SODIUM 5000 [USP'U]/ML
5000 INJECTION, SOLUTION INTRAVENOUS; SUBCUTANEOUS EVERY 8 HOURS SCHEDULED
Status: DISCONTINUED | OUTPATIENT
Start: 2020-10-25 | End: 2020-10-26 | Stop reason: HOSPADM

## 2020-10-24 RX ORDER — SODIUM CHLORIDE FOR INHALATION 3 %
15 VIAL, NEBULIZER (ML) INHALATION
Status: DISCONTINUED | OUTPATIENT
Start: 2020-10-25 | End: 2020-10-25

## 2020-10-24 RX ORDER — METHOCARBAMOL 500 MG/1
500 TABLET, FILM COATED ORAL 3 TIMES DAILY
Status: DISCONTINUED | OUTPATIENT
Start: 2020-10-24 | End: 2020-10-26 | Stop reason: HOSPADM

## 2020-10-24 RX ORDER — NICOTINE POLACRILEX 4 MG
15 LOZENGE BUCCAL PRN
Status: DISCONTINUED | OUTPATIENT
Start: 2020-10-24 | End: 2020-10-26 | Stop reason: HOSPADM

## 2020-10-24 RX ORDER — ACETAMINOPHEN 500 MG
1000 TABLET ORAL EVERY 6 HOURS
Status: DISCONTINUED | OUTPATIENT
Start: 2020-10-24 | End: 2020-10-26 | Stop reason: HOSPADM

## 2020-10-24 RX ORDER — IPRATROPIUM BROMIDE AND ALBUTEROL SULFATE 2.5; .5 MG/3ML; MG/3ML
1 SOLUTION RESPIRATORY (INHALATION)
Status: DISCONTINUED | OUTPATIENT
Start: 2020-10-24 | End: 2020-10-26 | Stop reason: HOSPADM

## 2020-10-24 RX ORDER — SODIUM CHLORIDE FOR INHALATION 3 %
15 VIAL, NEBULIZER (ML) INHALATION
Status: DISCONTINUED | OUTPATIENT
Start: 2020-10-24 | End: 2020-10-24

## 2020-10-24 RX ORDER — ACETAMINOPHEN 500 MG
1000 TABLET ORAL EVERY 6 HOURS PRN
Status: DISCONTINUED | OUTPATIENT
Start: 2020-10-24 | End: 2020-10-24

## 2020-10-24 RX ORDER — 0.9 % SODIUM CHLORIDE 0.9 %
1000 INTRAVENOUS SOLUTION INTRAVENOUS ONCE
Status: COMPLETED | OUTPATIENT
Start: 2020-10-24 | End: 2020-10-24

## 2020-10-24 RX ADMIN — ARFORMOTEROL TARTRATE 15 MCG: 15 SOLUTION RESPIRATORY (INHALATION) at 19:50

## 2020-10-24 RX ADMIN — SODIUM CHLORIDE 30 MG/ML INHALATION SOLUTION 15 ML: 30 SOLUTION INHALANT at 19:50

## 2020-10-24 RX ADMIN — ACETAMINOPHEN 1000 MG: 500 TABLET ORAL at 21:55

## 2020-10-24 RX ADMIN — METHOCARBAMOL 500 MG: 100 INJECTION, SOLUTION INTRAMUSCULAR; INTRAVENOUS at 04:55

## 2020-10-24 RX ADMIN — METHOCARBAMOL 500 MG: 500 TABLET ORAL at 21:55

## 2020-10-24 RX ADMIN — SODIUM CHLORIDE 1000 ML: 9 INJECTION, SOLUTION INTRAVENOUS at 11:52

## 2020-10-24 RX ADMIN — INSULIN HUMAN 6 UNITS: 100 INJECTION, SOLUTION PARENTERAL at 18:10

## 2020-10-24 RX ADMIN — Medication 10 ML: at 09:09

## 2020-10-24 RX ADMIN — HYDROMORPHONE HYDROCHLORIDE 0.5 MG: 1 INJECTION, SOLUTION INTRAMUSCULAR; INTRAVENOUS; SUBCUTANEOUS at 03:54

## 2020-10-24 RX ADMIN — INSULIN HUMAN 3 UNITS: 100 INJECTION, SOLUTION PARENTERAL at 21:56

## 2020-10-24 RX ADMIN — TAMSULOSIN HYDROCHLORIDE 0.4 MG: 0.4 CAPSULE ORAL at 09:08

## 2020-10-24 RX ADMIN — PANTOPRAZOLE SODIUM 40 MG: 40 TABLET, DELAYED RELEASE ORAL at 09:08

## 2020-10-24 RX ADMIN — OXYCODONE HYDROCHLORIDE 10 MG: 5 TABLET ORAL at 11:53

## 2020-10-24 RX ADMIN — SODIUM CHLORIDE: 9 INJECTION, SOLUTION INTRAVENOUS at 22:00

## 2020-10-24 RX ADMIN — ENOXAPARIN SODIUM 40 MG: 40 INJECTION SUBCUTANEOUS at 09:09

## 2020-10-24 RX ADMIN — IPRATROPIUM BROMIDE AND ALBUTEROL SULFATE 1 AMPULE: .5; 3 SOLUTION RESPIRATORY (INHALATION) at 19:50

## 2020-10-24 RX ADMIN — SODIUM CHLORIDE: 9 INJECTION, SOLUTION INTRAVENOUS at 13:36

## 2020-10-24 RX ADMIN — METHOCARBAMOL 500 MG: 500 TABLET ORAL at 15:18

## 2020-10-24 RX ADMIN — ACETAMINOPHEN 1000 MG: 500 TABLET ORAL at 18:12

## 2020-10-24 RX ADMIN — BUDESONIDE INHALATION 500 MCG: 0.5 SUSPENSION RESPIRATORY (INHALATION) at 19:50

## 2020-10-24 RX ADMIN — ACETAMINOPHEN 1000 MG: 500 TABLET ORAL at 11:52

## 2020-10-24 RX ADMIN — Medication 5 MG: at 21:55

## 2020-10-24 RX ADMIN — MAGNESIUM SULFATE HEPTAHYDRATE 4 G: 40 INJECTION, SOLUTION INTRAVENOUS at 13:36

## 2020-10-24 RX ADMIN — METHOCARBAMOL 500 MG: 500 TABLET ORAL at 09:09

## 2020-10-24 RX ADMIN — ONDANSETRON 4 MG: 2 INJECTION INTRAMUSCULAR; INTRAVENOUS at 02:50

## 2020-10-24 ASSESSMENT — PAIN SCALES - GENERAL
PAINLEVEL_OUTOF10: 2
PAINLEVEL_OUTOF10: 3
PAINLEVEL_OUTOF10: 7
PAINLEVEL_OUTOF10: 2
PAINLEVEL_OUTOF10: 7
PAINLEVEL_OUTOF10: 2
PAINLEVEL_OUTOF10: 0
PAINLEVEL_OUTOF10: 0

## 2020-10-24 ASSESSMENT — PAIN DESCRIPTION - ORIENTATION
ORIENTATION: RIGHT;LEFT;MID
ORIENTATION: RIGHT;LEFT;MID

## 2020-10-24 ASSESSMENT — PAIN DESCRIPTION - DESCRIPTORS
DESCRIPTORS: DISCOMFORT;ACHING
DESCRIPTORS: DISCOMFORT

## 2020-10-24 ASSESSMENT — PAIN DESCRIPTION - LOCATION
LOCATION: ABDOMEN
LOCATION: ABDOMEN

## 2020-10-24 ASSESSMENT — PAIN DESCRIPTION - FREQUENCY
FREQUENCY: INTERMITTENT
FREQUENCY: INTERMITTENT

## 2020-10-24 ASSESSMENT — PAIN DESCRIPTION - ONSET
ONSET: GRADUAL
ONSET: ON-GOING

## 2020-10-24 ASSESSMENT — PAIN DESCRIPTION - PROGRESSION
CLINICAL_PROGRESSION: GRADUALLY IMPROVING
CLINICAL_PROGRESSION: GRADUALLY IMPROVING

## 2020-10-24 ASSESSMENT — PAIN DESCRIPTION - PAIN TYPE
TYPE: SURGICAL PAIN
TYPE: SURGICAL PAIN

## 2020-10-24 NOTE — PROGRESS NOTES
Pt arrived from PACU into 5308. Transferred patient to bed. VSS with exception to . Pt on 2L NC. Admission completed. Pt oriented to room. Call light within reach. Bed alarm engaged. Will continue to monitor.

## 2020-10-24 NOTE — PROGRESS NOTES
Pt tachycardic around 106 this shift. C/o abdominal discomfort and tightness. PRN oxycodone and dilaudid administered. Spoke with Dr. Ravindra Pena and received orders for scheduled robaxin. Upon reassessment, pt asleep. Harvey in place draining pink/bloody urine. Abdominal binder in place. Lap sites remain CDI. All needs met at this time.

## 2020-10-24 NOTE — PROGRESS NOTES
UROLOGY ATTENDING PROGRESS NOTE  [facility]    /64   Pulse 103   Temp 99.1 °F (37.3 °C) (Oral)   Resp 18   SpO2 91%   Temp (24hrs), Av.8 °F (35.4 °C), Min:90 °F (32.2 °C), Max:99.1 °F (37.3 °C)      Intake/Output Summary (Last 24 hours) at 10/24/2020 1306  Last data filed at 10/24/2020 0700  Gross per 24 hour   Intake 6314.25 ml   Output 1525 ml   Net 4789.25 ml     Gen: NAD, AOx4  PULM: Breathing unlabored    Lab Results   Component Value Date    WBC 11.1 (H) 10/24/2020    HGB 12.3 (L) 10/24/2020    HCT 37.1 (L) 10/24/2020    MCV 90.0 10/24/2020     10/24/2020     Lab Results   Component Value Date     10/24/2020    K 4.6 10/24/2020    CL 98 10/24/2020    CO2 21 10/24/2020    BUN 27 10/24/2020    CREATININE 1.4 10/24/2020    GLUCOSE 217 10/24/2020    CALCIUM 8.4 10/24/2020        A/P: 78 y.o. male  With dove trauma, BNC and inability to pass catheter s/p intraoperative consult for cystoscopy, udil and catheter placement. Significant gu hx of turp x 2  >> dove can be removed by patient on Wednesday.   >> he sees Dr. Ryne Valdez in our group as an outpatient. He can continue following up with him. Patient denies any prior urinary symptoms.   Naa Johnston MD

## 2020-10-24 NOTE — PROGRESS NOTES
PACU Transfer Note    Current Allergies: Patient has no known allergies. Pt meets criteria as per Mary Score and ASPAN Standards to transfer to next phase of care. Recent Labs     10/23/20  1245 10/23/20  1900   POCGLU 152* 187*         Vitals:    10/23/20 2015   BP: 136/77   Pulse: 103   Resp: 15   Temp: 97.4 °F (36.3 °C)   SpO2: 91%       SpO2: 91 %    O2 Flow Rate (L/min): 3 L/min      Intake/Output Summary (Last 24 hours) at 10/23/2020 2032  Last data filed at 10/23/2020 2032  Gross per 24 hour   Intake 4708 ml   Output 1075 ml   Net 3633 ml       Pain assessment:  none         No other skin issues noted. Is patient incontinent: no       Handoff report given at bedside.    Family updated and directed to pt room      10/23/2020 8:32 PM

## 2020-10-24 NOTE — PROGRESS NOTES
General Surgery   Daily Progress Note  Patient: Shani Trejo    CC: Robotic incisional incarcerated hernia repair with mesh, bilateral posterior compartment separation, cystoscopy, flexible cystoscopy, urethral dilation, coude placement    SUBJECTIVE:  Patient states that pain is well controlled. HDS. On minimal nasal cannula. ROS: A 14 point review of systems was conducted, significant findings as noted above. OBJECTIVE:   Infusions:   lactated ringers 125 mL/hr at 10/23/20 2312      I/O:I/O last 3 completed shifts: In: 5214.3 [P.O.:240; I.V.:4974.3]  Out: 1525 [Urine:1475; Blood:50]           Wt Readings from Last 1 Encounters:   10/15/20 208 lb (94.3 kg)       Exam:  /71   Pulse 101   Temp 98.3 °F (36.8 °C) (Oral)   Resp 16   SpO2 92%      General appearance: alert, no acute distress, grooming appropriate  Eyes: PERRLA, no scleral icterus  Neck: trachea midline, no JVD  Chest/Lungs: CTAB, normal effort, on NC  Cardiovascular: RRR, no murmurs/gallops/rubs  Abdomen: soft, non-tender, non-distended, incisions clean, dry and intact covered in dermabond. Skin: warm and dry, no rashes  Extremities: no edema, no cyanosis  Neuro: A&Ox3, no focal deficits, sensation intact              LABS: No results for input(s): WBC, HGB, HCT, MCV, PLT in the last 72 hours. No results for input(s): NA, K, CL, CO2, PHOS, BUN, CREATININE in the last 72 hours. Invalid input(s): CA   No results for input(s): AST, ALT, ALB, BILIDIR, BILITOT, ALKPHOS in the last 72 hours. No results for input(s): LIPASE, AMYLASE in the last 72 hours. No results for input(s): PROT, INR, APTT in the last 72 hours. No results for input(s): CKTOTAL, CKMB, CKMBINDEX, TROPONINI in the last 72 hours. ASSESSMENT/PLAN:   Patient is a 78 y.o. male s/p Robotic incisional incarcerated hernia repair with mesh, bilateral posterior compartment separation, cystoscopy, flexible cystoscopy, urethral dilation, coude placement 10/23. POD#1.    - Harvey catheter placed 10/23. Will need to stay for 7 days.   - Encourage ambulation and OOB to chair.   - SLIV  - Medications switched to oral.   - Anticipate discharge today or tomorrow.      Jimmy Bustillo MD  General Surgery, PGY1  10/24/20  7:16 AM  [phone]

## 2020-10-24 NOTE — PROGRESS NOTES
4 Eyes Admission Assessment     I agree as the admission nurse that 2 RN's have performed a thorough Head to Toe Skin Assessment on the patient. ALL assessment sites listed below have been assessed on admission. Areas assessed by both nurses:   [x]   Head, Face, and Ears   [x]   Shoulders, Back, and Chest  [x]   Arms, Elbows, and Hands   [x]   Coccyx, Sacrum, and Ischium  []   Legs, Feet, and Heels        Does the Patient have Skin Breakdown?   No         Horacio Prevention initiated:  No   Wound Care Orders initiated:  No      [facility] nurse consulted for Pressure Injury (Stage 3,4, Unstageable, DTI, NWPT, and Complex wounds) or Horacio score 18 or lower:  No      Nurse 1 eSignature: Electronically signed by Palma Mckeon RN on 10/24/20 at 2:26 AM EDT    **SHARE this note so that the co-signing nurse is able to place an eSignature**    Nurse 2 eSignature: {Esignature:[id number]}

## 2020-10-24 NOTE — PROGRESS NOTES
0    Sputum   ,  , Sputum How Obtained: Spontaneous cough  Cough: Strong, spontaneous, non-productive = 0    Vital Signs   /71   Pulse 101   Temp 98.3 °F (36.8 °C) (Oral)   Resp 16   SpO2 92%   SPO2 (COPD values may differ): Greater than or equal to 92% on room air = 0    Peak Flow (asthma only): not applicable = 0    RSI: 0-4 = See once and convert to home regimen or discontinue        Plan       Goals: Medication delivery     Patient/caregiver was educated on the proper method of use for Respiratory Care Devices:  Yes      Level of patient/caregiver understanding able to:   ? Verbalize understanding   ? Demonstrate understanding       ? Teach back        ? Needs reinforcement       ? No available caregiver               ? Other:     Response to education:  Good     Is patient being placed on Home Treatment Regimen? Yes     Does the patient have everything they need prior to discharge? NA     Plan of Care: Continue with home regimen as ordered     Electronically signed by Akanksha Haddad RCP on 10/24/2020 at 3:56 AM    Respiratory Protocol Guidelines     1. Assessment and treatment by Respiratory Therapy will be initiated for medication and therapeutic interventions upon initiation of aerosolized medication. 2. Physician will be contacted for respiratory rate (RR) greater than 35 breaths per minute. Therapy will be held for heart rate (HR) greater than 140 beats per minute, pending direction from physician. 3. Bronchodilators will be administered via Metered Dose Inhaler (MDI) with spacer when the following criteria are met:  a. Alert and cooperative     b. HR < 140 bpm  c. RR < 30 bpm                d. Can demonstrate a 2-3 second inspiratory hold  4. Bronchodilators will be administered via Hand Held Nebulizer [facility]) to patients when ANY of the following criteria are met  a. Incognizant or uncooperative          b. Patients treated with HHN at Home        c.  Unable to demonstrate proper use of MDI with spacer     d. RR > 30 bpm   5. Bronchodilators will be delivered via Metered Dose Inhaler (MDI), HHN, Aerogen to intubated patients on mechanical ventilation. 6. Inhalation medication orders will be delivered and/or substituted as outlined below. Aerosolized Medications Ordering and Administration Guidelines:    1. All Medications will be ordered by a physician, and their frequency and/or modality will be adjusted as defined by the patients Respiratory Severity Index (RSI) score. 2. If the patient does not have documented COPD, consider discontinuing anticholinergics when RSI is less than 9.  3. If the bronchospasm worsens (increased RSI), then the bronchodilator frequency can be increased to a maximum of every 4 hours. If greater than every 4 hours is required, the physician will be contacted. 4. If the bronchospasm improves, the frequency of the bronchodilator can be decreased, based on the patient's RSI, but not less than home treatment regimen frequency. 5. Bronchodilator(s) will be discontinued if patient has a RSI less than 9 and has received no scheduled or as needed treatment for 72  Hrs. Patients Ordered on a Mucolytic Agent:    1. Must always be administered with a bronchodilator. 2. Discontinue if patient experiences worsened bronchospasm, or secretions have lessened to the point that the patient is able to clear them with a cough. Anti-inflammatory and Combination Medications:    1. If the patient lacks prior history of lung disease, is not using inhaled anti-inflammatory medication at home, and lacks wheezing by examination or by history for at least 24 hours, contact physician for possible discontinuation.

## 2020-10-25 LAB
ALBUMIN SERPL-MCNC: 3.2 G/DL (ref 3.4–5)
ANION GAP SERPL CALCULATED.3IONS-SCNC: 9 MMOL/L (ref 3–16)
BASOPHILS ABSOLUTE: 0.1 K/UL (ref 0–0.2)
BASOPHILS RELATIVE PERCENT: 0.7 %
BUN BLDV-MCNC: 24 MG/DL (ref 7–20)
CALCIUM SERPL-MCNC: 7.5 MG/DL (ref 8.3–10.6)
CHLORIDE BLD-SCNC: 102 MMOL/L (ref 99–110)
CO2: 24 MMOL/L (ref 21–32)
CREAT SERPL-MCNC: 1.3 MG/DL (ref 0.8–1.3)
EOSINOPHILS ABSOLUTE: 0.2 K/UL (ref 0–0.6)
EOSINOPHILS RELATIVE PERCENT: 2.1 %
GFR AFRICAN AMERICAN: >60
GFR NON-AFRICAN AMERICAN: 55
GLUCOSE BLD-MCNC: 153 MG/DL (ref 70–99)
GLUCOSE BLD-MCNC: 158 MG/DL (ref 70–99)
GLUCOSE BLD-MCNC: 163 MG/DL (ref 70–99)
GLUCOSE BLD-MCNC: 205 MG/DL (ref 70–99)
HCT VFR BLD CALC: 28 % (ref 40.5–52.5)
HCT VFR BLD CALC: 28 % (ref 40.5–52.5)
HEMOGLOBIN: 9.5 G/DL (ref 13.5–17.5)
HEMOGLOBIN: 9.7 G/DL (ref 13.5–17.5)
LYMPHOCYTES ABSOLUTE: 1.3 K/UL (ref 1–5.1)
LYMPHOCYTES RELATIVE PERCENT: 17 %
MAGNESIUM: 2.2 MG/DL (ref 1.8–2.4)
MCH RBC QN AUTO: 30.3 PG (ref 26–34)
MCHC RBC AUTO-ENTMCNC: 34.1 G/DL (ref 31–36)
MCV RBC AUTO: 88.9 FL (ref 80–100)
MONOCYTES ABSOLUTE: 0.9 K/UL (ref 0–1.3)
MONOCYTES RELATIVE PERCENT: 11.4 %
NEUTROPHILS ABSOLUTE: 5.3 K/UL (ref 1.7–7.7)
NEUTROPHILS RELATIVE PERCENT: 68.8 %
PDW BLD-RTO: 14.1 % (ref 12.4–15.4)
PERFORMED ON: ABNORMAL
PHOSPHORUS: 2.3 MG/DL (ref 2.5–4.9)
PLATELET # BLD: 195 K/UL (ref 135–450)
PMV BLD AUTO: 7.7 FL (ref 5–10.5)
POTASSIUM SERPL-SCNC: 3.7 MMOL/L (ref 3.5–5.1)
PRO-BNP: 444 PG/ML (ref 0–124)
RBC # BLD: 3.15 M/UL (ref 4.2–5.9)
SODIUM BLD-SCNC: 135 MMOL/L (ref 136–145)
WBC # BLD: 7.7 K/UL (ref 4–11)

## 2020-10-25 PROCEDURE — 6370000000 HC RX 637 (ALT 250 FOR IP): Performed by: STUDENT IN AN ORGANIZED HEALTH CARE EDUCATION/TRAINING PROGRAM

## 2020-10-25 PROCEDURE — 85025 COMPLETE CBC W/AUTO DIFF WBC: CPT

## 2020-10-25 PROCEDURE — 2580000003 HC RX 258: Performed by: STUDENT IN AN ORGANIZED HEALTH CARE EDUCATION/TRAINING PROGRAM

## 2020-10-25 PROCEDURE — 83735 ASSAY OF MAGNESIUM: CPT

## 2020-10-25 PROCEDURE — 80069 RENAL FUNCTION PANEL: CPT

## 2020-10-25 PROCEDURE — 2580000003 HC RX 258: Performed by: SURGERY

## 2020-10-25 PROCEDURE — 83880 ASSAY OF NATRIURETIC PEPTIDE: CPT

## 2020-10-25 PROCEDURE — 94669 MECHANICAL CHEST WALL OSCILL: CPT

## 2020-10-25 PROCEDURE — 36415 COLL VENOUS BLD VENIPUNCTURE: CPT

## 2020-10-25 PROCEDURE — 6360000002 HC RX W HCPCS: Performed by: STUDENT IN AN ORGANIZED HEALTH CARE EDUCATION/TRAINING PROGRAM

## 2020-10-25 PROCEDURE — 2700000000 HC OXYGEN THERAPY PER DAY

## 2020-10-25 PROCEDURE — 85018 HEMOGLOBIN: CPT

## 2020-10-25 PROCEDURE — 1200000000 HC SEMI PRIVATE

## 2020-10-25 PROCEDURE — 94761 N-INVAS EAR/PLS OXIMETRY MLT: CPT

## 2020-10-25 PROCEDURE — 85014 HEMATOCRIT: CPT

## 2020-10-25 PROCEDURE — 94640 AIRWAY INHALATION TREATMENT: CPT

## 2020-10-25 RX ORDER — GLIPIZIDE 5 MG/1
20 TABLET ORAL 2 TIMES DAILY
Status: DISCONTINUED | OUTPATIENT
Start: 2020-10-25 | End: 2020-10-26 | Stop reason: HOSPADM

## 2020-10-25 RX ORDER — ASPIRIN 81 MG/1
81 TABLET ORAL DAILY
Status: DISCONTINUED | OUTPATIENT
Start: 2020-10-26 | End: 2020-10-26 | Stop reason: HOSPADM

## 2020-10-25 RX ORDER — VITAMIN B COMPLEX
1 CAPSULE ORAL DAILY
Status: DISCONTINUED | OUTPATIENT
Start: 2020-10-25 | End: 2020-10-26 | Stop reason: HOSPADM

## 2020-10-25 RX ORDER — SODIUM CHLORIDE FOR INHALATION 3 %
15 VIAL, NEBULIZER (ML) INHALATION
Status: DISCONTINUED | OUTPATIENT
Start: 2020-10-25 | End: 2020-10-26 | Stop reason: HOSPADM

## 2020-10-25 RX ORDER — HYDROCHLOROTHIAZIDE 12.5 MG/1
12.5 CAPSULE, GELATIN COATED ORAL DAILY
Status: DISCONTINUED | OUTPATIENT
Start: 2020-10-25 | End: 2020-10-26 | Stop reason: HOSPADM

## 2020-10-25 RX ORDER — ATORVASTATIN CALCIUM 10 MG/1
10 TABLET, FILM COATED ORAL EVERY EVENING
Status: DISCONTINUED | OUTPATIENT
Start: 2020-10-25 | End: 2020-10-26 | Stop reason: HOSPADM

## 2020-10-25 RX ORDER — DOCUSATE SODIUM 100 MG/1
100 CAPSULE, LIQUID FILLED ORAL 2 TIMES DAILY
Status: DISCONTINUED | OUTPATIENT
Start: 2020-10-25 | End: 2020-10-26 | Stop reason: HOSPADM

## 2020-10-25 RX ADMIN — DICLOFENAC 2 G: 10 GEL TOPICAL at 17:16

## 2020-10-25 RX ADMIN — INSULIN HUMAN 6 UNITS: 100 INJECTION, SOLUTION PARENTERAL at 10:52

## 2020-10-25 RX ADMIN — Medication 10 ML: at 22:41

## 2020-10-25 RX ADMIN — IPRATROPIUM BROMIDE AND ALBUTEROL SULFATE 1 AMPULE: .5; 3 SOLUTION RESPIRATORY (INHALATION) at 22:05

## 2020-10-25 RX ADMIN — METHOCARBAMOL 500 MG: 500 TABLET ORAL at 16:34

## 2020-10-25 RX ADMIN — POTASSIUM CHLORIDE 30 MEQ: 750 TABLET, EXTENDED RELEASE ORAL at 09:14

## 2020-10-25 RX ADMIN — Medication 1 CAPSULE: at 09:14

## 2020-10-25 RX ADMIN — Medication 15 ML: at 11:18

## 2020-10-25 RX ADMIN — POTASSIUM PHOSPHATE, MONOBASIC 500 MG: 500 TABLET, SOLUBLE ORAL at 09:14

## 2020-10-25 RX ADMIN — ARFORMOTEROL TARTRATE 15 MCG: 15 SOLUTION RESPIRATORY (INHALATION) at 22:04

## 2020-10-25 RX ADMIN — METHOCARBAMOL 500 MG: 500 TABLET ORAL at 21:14

## 2020-10-25 RX ADMIN — INSULIN HUMAN 3 UNITS: 100 INJECTION, SOLUTION PARENTERAL at 21:27

## 2020-10-25 RX ADMIN — PANTOPRAZOLE SODIUM 40 MG: 40 TABLET, DELAYED RELEASE ORAL at 05:21

## 2020-10-25 RX ADMIN — INSULIN HUMAN 3 UNITS: 100 INJECTION, SOLUTION PARENTERAL at 17:10

## 2020-10-25 RX ADMIN — OXYCODONE HYDROCHLORIDE 10 MG: 5 TABLET ORAL at 16:45

## 2020-10-25 RX ADMIN — IPRATROPIUM BROMIDE AND ALBUTEROL SULFATE 1 AMPULE: .5; 3 SOLUTION RESPIRATORY (INHALATION) at 07:50

## 2020-10-25 RX ADMIN — DOCUSATE SODIUM 100 MG: 100 CAPSULE, LIQUID FILLED ORAL at 21:14

## 2020-10-25 RX ADMIN — TAMSULOSIN HYDROCHLORIDE 0.4 MG: 0.4 CAPSULE ORAL at 09:14

## 2020-10-25 RX ADMIN — BUDESONIDE INHALATION 500 MCG: 0.5 SUSPENSION RESPIRATORY (INHALATION) at 22:05

## 2020-10-25 RX ADMIN — Medication 15 ML: at 15:39

## 2020-10-25 RX ADMIN — ACETAMINOPHEN 1000 MG: 500 TABLET ORAL at 16:45

## 2020-10-25 RX ADMIN — HYDROCHLOROTHIAZIDE 12.5 MG: 12.5 CAPSULE ORAL at 09:15

## 2020-10-25 RX ADMIN — OXYCODONE HYDROCHLORIDE 10 MG: 5 TABLET ORAL at 09:14

## 2020-10-25 RX ADMIN — HEPARIN SODIUM 5000 UNITS: 5000 INJECTION INTRAVENOUS; SUBCUTANEOUS at 21:27

## 2020-10-25 RX ADMIN — SODIUM CHLORIDE 30 MG/ML INHALATION SOLUTION 15 ML: 30 SOLUTION INHALANT at 07:50

## 2020-10-25 RX ADMIN — METFORMIN HYDROCHLORIDE 1000 MG: 1000 TABLET ORAL at 17:10

## 2020-10-25 RX ADMIN — METHOCARBAMOL 500 MG: 500 TABLET ORAL at 09:14

## 2020-10-25 RX ADMIN — BUDESONIDE INHALATION 500 MCG: 0.5 SUSPENSION RESPIRATORY (INHALATION) at 07:50

## 2020-10-25 RX ADMIN — ACETAMINOPHEN 1000 MG: 500 TABLET ORAL at 04:08

## 2020-10-25 RX ADMIN — GLIPIZIDE 20 MG: 5 TABLET ORAL at 17:10

## 2020-10-25 RX ADMIN — IPRATROPIUM BROMIDE AND ALBUTEROL SULFATE 1 AMPULE: .5; 3 SOLUTION RESPIRATORY (INHALATION) at 11:18

## 2020-10-25 RX ADMIN — OXYCODONE 5 MG: 5 TABLET ORAL at 02:58

## 2020-10-25 RX ADMIN — DOCUSATE SODIUM 100 MG: 100 CAPSULE, LIQUID FILLED ORAL at 09:14

## 2020-10-25 RX ADMIN — ARFORMOTEROL TARTRATE 15 MCG: 15 SOLUTION RESPIRATORY (INHALATION) at 07:51

## 2020-10-25 RX ADMIN — POTASSIUM PHOSPHATE, MONOBASIC 500 MG: 500 TABLET, SOLUBLE ORAL at 17:10

## 2020-10-25 RX ADMIN — ACETAMINOPHEN 1000 MG: 500 TABLET ORAL at 21:16

## 2020-10-25 RX ADMIN — IPRATROPIUM BROMIDE AND ALBUTEROL SULFATE 1 AMPULE: .5; 3 SOLUTION RESPIRATORY (INHALATION) at 15:39

## 2020-10-25 RX ADMIN — Medication 5 MG: at 21:14

## 2020-10-25 RX ADMIN — ATORVASTATIN CALCIUM 10 MG: 10 TABLET, FILM COATED ORAL at 18:51

## 2020-10-25 RX ADMIN — Medication 15 ML: at 22:04

## 2020-10-25 RX ADMIN — METFORMIN HYDROCHLORIDE 1000 MG: 1000 TABLET ORAL at 09:15

## 2020-10-25 ASSESSMENT — PAIN - FUNCTIONAL ASSESSMENT
PAIN_FUNCTIONAL_ASSESSMENT: PREVENTS OR INTERFERES SOME ACTIVE ACTIVITIES AND ADLS

## 2020-10-25 ASSESSMENT — PAIN DESCRIPTION - PROGRESSION
CLINICAL_PROGRESSION: NOT CHANGED
CLINICAL_PROGRESSION: GRADUALLY IMPROVING
CLINICAL_PROGRESSION: NOT CHANGED
CLINICAL_PROGRESSION: GRADUALLY IMPROVING

## 2020-10-25 ASSESSMENT — PAIN DESCRIPTION - DESCRIPTORS
DESCRIPTORS: DISCOMFORT

## 2020-10-25 ASSESSMENT — PAIN DESCRIPTION - PAIN TYPE
TYPE: SURGICAL PAIN

## 2020-10-25 ASSESSMENT — PAIN DESCRIPTION - LOCATION
LOCATION: ABDOMEN
LOCATION: ABDOMEN;PENIS
LOCATION: ABDOMEN
LOCATION: ABDOMEN

## 2020-10-25 ASSESSMENT — PAIN DESCRIPTION - FREQUENCY
FREQUENCY: INTERMITTENT
FREQUENCY: CONTINUOUS
FREQUENCY: INTERMITTENT
FREQUENCY: INTERMITTENT

## 2020-10-25 ASSESSMENT — PAIN DESCRIPTION - ONSET
ONSET: ON-GOING
ONSET: GRADUAL

## 2020-10-25 ASSESSMENT — PAIN DESCRIPTION - ORIENTATION
ORIENTATION: RIGHT;LEFT;MID
ORIENTATION: RIGHT;LEFT;MID

## 2020-10-25 ASSESSMENT — PAIN SCALES - GENERAL
PAINLEVEL_OUTOF10: 2
PAINLEVEL_OUTOF10: 5
PAINLEVEL_OUTOF10: 5
PAINLEVEL_OUTOF10: 7
PAINLEVEL_OUTOF10: 1
PAINLEVEL_OUTOF10: 7

## 2020-10-25 NOTE — PROGRESS NOTES
General Surgery   Daily Progress Note  Patient: Jessica Hewitt    CC: Robotic incisional incarcerated hernia repair with mesh, bilateral posterior compartment separation, cystoscopy, flexible cystoscopy, urethral dilation, coude placement    SUBJECTIVE:  Patient states that pain is well controlled. HDS. On 2L NC.      ROS: A 14 point review of systems was conducted, significant findings as noted above. OBJECTIVE:   Infusions:   dextrose      sodium chloride 125 mL/hr at 10/24/20 2200      I/O:I/O last 3 completed shifts: In: 1000 [IV Piggyback:1000]  Out: 700 [Urine:700]           Wt Readings from Last 1 Encounters:   10/15/20 208 lb (94.3 kg)       Exam:  /70   Pulse 92   Temp 99.2 °F (37.3 °C) (Oral)   Resp 17   SpO2 95%      General appearance: alert, no acute distress, grooming appropriate  Eyes: PERRLA, no scleral icterus  Neck: trachea midline, no JVD  Chest/Lungs: CTAB, normal effort, on NC  Cardiovascular: RRR, no murmurs/gallops/rubs  Abdomen: soft, non-tender, non-distended, incisions clean, dry and intact covered in dermabond. Skin: warm and dry, no rashes  Extremities: no edema, no cyanosis  Neuro: A&Ox3, no focal deficits, sensation intact              LABS:   Recent Labs     10/24/20  0932   WBC 11.1*   HGB 12.3*   HCT 37.1*   MCV 90.0           Recent Labs     10/24/20  1504 10/25/20  0521   * 135*   K 4.2 3.7    102   CO2 22 24   PHOS 3.5 2.3*   BUN 26* 24*   CREATININE 1.6* 1.3      No results for input(s): AST, ALT, ALB, BILIDIR, BILITOT, ALKPHOS in the last 72 hours. No results for input(s): LIPASE, AMYLASE in the last 72 hours. No results for input(s): PROT, INR, APTT in the last 72 hours. No results for input(s): CKTOTAL, CKMB, CKMBINDEX, TROPONINI in the last 72 hours.     ASSESSMENT/PLAN:   Patient is a 78 y.o. male s/p Robotic incisional incarcerated hernia repair with mesh, bilateral posterior compartment separation, cystoscopy, flexible cystoscopy,

## 2020-10-25 NOTE — PROGRESS NOTES
Patient A&Ox4. VSS. Patient able to stay off oxygen this shift. Pain noted at dove insertion site. Patient ambulated in hallway and sat in chair twice this shift. Pain controlled with oral medication per MAR. Patient tolerating diet and liquids. Fall precautions in place. Will continue to monitor.

## 2020-10-25 NOTE — PROGRESS NOTES
Patient had an oxygen level of 85-86% on room air. Nurse placed him on 2 liters of oxygen to get his SPO2 in the 90's. Ni orbital edema was noted along with crackles in the base of the lungs and mild wheezing noted on the right side. Low urine output in dove. Bladder scan was done and pt had 156 in his bladder with dove in place and draining. Nurse notified surgery and Dr. Darryl Shay came to the bedside. Patient ambulated in the hallways once today and was up to the chair for several hours. IS encouraged and patient was doing every hour. Will continue to monitor.

## 2020-10-25 NOTE — PROGRESS NOTES
Pt alert and oriented. VSS. 95% on 2L NC. Pt complaining of pain at catheter insertion site. IVF infusing. Call light within reach.  Will continue to monitor

## 2020-10-26 VITALS
SYSTOLIC BLOOD PRESSURE: 97 MMHG | OXYGEN SATURATION: 92 % | RESPIRATION RATE: 18 BRPM | DIASTOLIC BLOOD PRESSURE: 62 MMHG | HEART RATE: 89 BPM | TEMPERATURE: 99.1 F

## 2020-10-26 LAB
ALBUMIN SERPL-MCNC: 3.1 G/DL (ref 3.4–5)
ANION GAP SERPL CALCULATED.3IONS-SCNC: 9 MMOL/L (ref 3–16)
BASOPHILS ABSOLUTE: 0 K/UL (ref 0–0.2)
BASOPHILS RELATIVE PERCENT: 0.4 %
BUN BLDV-MCNC: 16 MG/DL (ref 7–20)
CALCIUM SERPL-MCNC: 7.6 MG/DL (ref 8.3–10.6)
CHLORIDE BLD-SCNC: 106 MMOL/L (ref 99–110)
CO2: 24 MMOL/L (ref 21–32)
CREAT SERPL-MCNC: 1 MG/DL (ref 0.8–1.3)
EOSINOPHILS ABSOLUTE: 0.4 K/UL (ref 0–0.6)
EOSINOPHILS RELATIVE PERCENT: 5.1 %
GFR AFRICAN AMERICAN: >60
GFR NON-AFRICAN AMERICAN: >60
GLUCOSE BLD-MCNC: 83 MG/DL (ref 70–99)
HCT VFR BLD CALC: 26.7 % (ref 40.5–52.5)
HEMOGLOBIN: 9.1 G/DL (ref 13.5–17.5)
LYMPHOCYTES ABSOLUTE: 1.6 K/UL (ref 1–5.1)
LYMPHOCYTES RELATIVE PERCENT: 19.4 %
MAGNESIUM: 1.9 MG/DL (ref 1.8–2.4)
MCH RBC QN AUTO: 30.6 PG (ref 26–34)
MCHC RBC AUTO-ENTMCNC: 34.1 G/DL (ref 31–36)
MCV RBC AUTO: 89.8 FL (ref 80–100)
MONOCYTES ABSOLUTE: 0.8 K/UL (ref 0–1.3)
MONOCYTES RELATIVE PERCENT: 9.4 %
NEUTROPHILS ABSOLUTE: 5.4 K/UL (ref 1.7–7.7)
NEUTROPHILS RELATIVE PERCENT: 65.7 %
PDW BLD-RTO: 14.1 % (ref 12.4–15.4)
PHOSPHORUS: 1.5 MG/DL (ref 2.5–4.9)
PLATELET # BLD: 192 K/UL (ref 135–450)
PMV BLD AUTO: 7.5 FL (ref 5–10.5)
POTASSIUM SERPL-SCNC: 4 MMOL/L (ref 3.5–5.1)
RBC # BLD: 2.97 M/UL (ref 4.2–5.9)
SODIUM BLD-SCNC: 139 MMOL/L (ref 136–145)
WBC # BLD: 8.2 K/UL (ref 4–11)

## 2020-10-26 PROCEDURE — 80069 RENAL FUNCTION PANEL: CPT

## 2020-10-26 PROCEDURE — 6370000000 HC RX 637 (ALT 250 FOR IP): Performed by: STUDENT IN AN ORGANIZED HEALTH CARE EDUCATION/TRAINING PROGRAM

## 2020-10-26 PROCEDURE — 2580000003 HC RX 258: Performed by: INTERNAL MEDICINE

## 2020-10-26 PROCEDURE — 36415 COLL VENOUS BLD VENIPUNCTURE: CPT

## 2020-10-26 PROCEDURE — 2500000003 HC RX 250 WO HCPCS: Performed by: INTERNAL MEDICINE

## 2020-10-26 PROCEDURE — 85025 COMPLETE CBC W/AUTO DIFF WBC: CPT

## 2020-10-26 PROCEDURE — 2580000003 HC RX 258: Performed by: STUDENT IN AN ORGANIZED HEALTH CARE EDUCATION/TRAINING PROGRAM

## 2020-10-26 PROCEDURE — 6360000002 HC RX W HCPCS: Performed by: STUDENT IN AN ORGANIZED HEALTH CARE EDUCATION/TRAINING PROGRAM

## 2020-10-26 PROCEDURE — 83735 ASSAY OF MAGNESIUM: CPT

## 2020-10-26 RX ADMIN — Medication 1 CAPSULE: at 09:23

## 2020-10-26 RX ADMIN — HEPARIN SODIUM 5000 UNITS: 5000 INJECTION INTRAVENOUS; SUBCUTANEOUS at 05:14

## 2020-10-26 RX ADMIN — DOCUSATE SODIUM 100 MG: 100 CAPSULE, LIQUID FILLED ORAL at 09:24

## 2020-10-26 RX ADMIN — PANTOPRAZOLE SODIUM 40 MG: 40 TABLET, DELAYED RELEASE ORAL at 05:23

## 2020-10-26 RX ADMIN — ACETAMINOPHEN 1000 MG: 500 TABLET ORAL at 09:22

## 2020-10-26 RX ADMIN — POTASSIUM PHOSPHATE, MONOBASIC AND POTASSIUM PHOSPHATE, DIBASIC 30 MMOL: 224; 236 INJECTION, SOLUTION, CONCENTRATE INTRAVENOUS at 09:22

## 2020-10-26 RX ADMIN — Medication 10 ML: at 09:24

## 2020-10-26 RX ADMIN — TAMSULOSIN HYDROCHLORIDE 0.4 MG: 0.4 CAPSULE ORAL at 09:23

## 2020-10-26 RX ADMIN — METHOCARBAMOL 500 MG: 500 TABLET ORAL at 09:24

## 2020-10-26 RX ADMIN — ASPIRIN 81 MG: 81 TABLET, COATED ORAL at 09:24

## 2020-10-26 RX ADMIN — METFORMIN HYDROCHLORIDE 1000 MG: 1000 TABLET ORAL at 09:23

## 2020-10-26 RX ADMIN — GLIPIZIDE 20 MG: 5 TABLET ORAL at 05:23

## 2020-10-26 RX ADMIN — HYDROCHLOROTHIAZIDE 12.5 MG: 12.5 CAPSULE ORAL at 09:24

## 2020-10-26 ASSESSMENT — PAIN SCALES - GENERAL
PAINLEVEL_OUTOF10: 2
PAINLEVEL_OUTOF10: 3

## 2020-10-26 ASSESSMENT — PAIN DESCRIPTION - FREQUENCY: FREQUENCY: INTERMITTENT

## 2020-10-26 ASSESSMENT — PAIN - FUNCTIONAL ASSESSMENT: PAIN_FUNCTIONAL_ASSESSMENT: PREVENTS OR INTERFERES SOME ACTIVE ACTIVITIES AND ADLS

## 2020-10-26 ASSESSMENT — PAIN DESCRIPTION - PAIN TYPE: TYPE: SURGICAL PAIN

## 2020-10-26 ASSESSMENT — PAIN DESCRIPTION - LOCATION: LOCATION: ABDOMEN

## 2020-10-26 ASSESSMENT — PAIN DESCRIPTION - ONSET: ONSET: GRADUAL

## 2020-10-26 ASSESSMENT — PAIN DESCRIPTION - PROGRESSION: CLINICAL_PROGRESSION: GRADUALLY IMPROVING

## 2020-10-26 ASSESSMENT — PAIN DESCRIPTION - DESCRIPTORS: DESCRIPTORS: DISCOMFORT

## 2020-10-26 ASSESSMENT — PAIN DESCRIPTION - ORIENTATION: ORIENTATION: RIGHT;LEFT;MID

## 2020-10-26 NOTE — PROGRESS NOTES
Pt remained A&Ox4 and VSS. Urinary catheter remains intact draining clear, yellow urine. IV in R FA and L FA removed at bedside.  Pt received discharge instructions at bedside and was escorted safely to hospital lobby around 1120

## 2020-10-26 NOTE — PROGRESS NOTES
General Surgery   Daily Progress Note  Patient: Antoine Thomas    CC: Robotic incisional incarcerated hernia repair with mesh, bilateral posterior compartment separation, cystoscopy, flexible cystoscopy, urethral dilation, coude placement    SUBJECTIVE:  Tolerating PO   Passing flatus   On RA this AM    ROS: A 14 point review of systems was conducted, significant findings as noted above. OBJECTIVE:   Infusions:   dextrose        I/O:I/O last 3 completed shifts: In: 240 [P.O.:240]  Out: 1800 [Urine:1800]           Wt Readings from Last 1 Encounters:   10/15/20 208 lb (94.3 kg)       Exam:  /68   Pulse 92   Temp 97.8 °F (36.6 °C) (Oral)   Resp 18   SpO2 93%      General appearance: alert, no acute distress, grooming appropriate  Eyes: PERRLA, no scleral icterus  Neck: trachea midline, no JVD  Chest/Lungs: CTAB, normal effort, on NC  Cardiovascular: RRR, no murmurs/gallops/rubs  Abdomen: soft, approprietly tender, incisions clean, dry and intact covered in dermabond. Skin: warm and dry, no rashes  Extremities: no edema, no cyanosis  Neuro: A&Ox3, no focal deficits, sensation intact              LABS:   Recent Labs     10/24/20  0932 10/25/20  0521 10/25/20  1459   WBC 11.1* 7.7  --    HGB 12.3* 9.5* 9.7*   HCT 37.1* 28.0* 28.0*   MCV 90.0 88.9  --     195  --         Recent Labs     10/24/20  1504 10/25/20  0521   * 135*   K 4.2 3.7    102   CO2 22 24   PHOS 3.5 2.3*   BUN 26* 24*   CREATININE 1.6* 1.3      No results for input(s): AST, ALT, ALB, BILIDIR, BILITOT, ALKPHOS in the last 72 hours. No results for input(s): LIPASE, AMYLASE in the last 72 hours. No results for input(s): PROT, INR, APTT in the last 72 hours. No results for input(s): CKTOTAL, CKMB, CKMBINDEX, TROPONINI in the last 72 hours.     ASSESSMENT/PLAN:   Patient is a 78 y.o. male s/p Robotic incisional incarcerated hernia repair with mesh, bilateral posterior compartment separation, cystoscopy, flexible cystoscopy, urethral dilation, coude placement 10/23.    - Doing well, tolerating PO  - Continue regular diet   - ASA 81 this AM      YEIMY   - Nephrology on board  - Cr improving   - SLIV this AM  - Follow up with Nephrology as outpatient     Traumatic dove  - Cysto and dove catheter placed 10/23 by Urology.    - Will need to stay for 7 days total   - Leg bag teach   - ok to follow up with Dr. Crys Thomas at the Urology group as an outpatient  - Pt can remove the catheter himself on Wed    Dispo: DC today     Eliel Anderson MD   General Surgery Resident

## 2020-10-26 NOTE — PROGRESS NOTES
Patient alert and oriented. VSS. Patient able to stay off oxygen this shift. Patient ambulated in hallway. Patient tolerating diet. Bed alarm is on. Call light within reach. Will continue to monitor.

## 2020-10-26 NOTE — CARE COORDINATION
Case Management Assessment            Discharge Note                    Date / Time of Note: 10/26/2020 10:05 AM                  Discharge Note Completed by: Korin Rubi    Patient Name: Ni Li   YOB: 1950  Diagnosis: Recurrent incisional hernia with incarceration [K43.0]  Incisional hernia, incarcerated [K43.0]   Date / Time: 10/23/2020 11:43 AM    Current PCP: Everette Baumgarten, TORITO - CNP  Clinic patient: No    Hospitalization in the last 30 days: No    Advance Directives:  Code Status: Full Code  [place] DNR form completed and on chart: No    Financial:  Payor: Alessandra Johnson / Plan: MEDICARE PART A AND B / Product Type: *No Product type* /      Pharmacy:    [email or web address] [address] [place] [address]  Phone: [phone] Fax: [phone]    [facility] [address] [place] [address] [place] [address] [phone] - F [phone]  [place] [address]  Phone: [phone] Fax: [phone]      Assistance purchasing medications?: Potential Assistance Purchasing Medications: No  Assistance provided by Case Management: None at this time    Does patient want to participate in local refill/ meds to beds program?: No    Meds To Beds General Rules:  1. Can ONLY be done Monday- Friday between 8:30am-5pm  2. Prescription(s) must be in pharmacy by 3pm to be filled same day  3. Copy of patient's insurance/ prescription drug card and patient face sheet must be sent along with the prescription(s)  4. Cost of Rx cannot be added to hospital bill. If financial assistance is needed, please contact unit  or ;  or  CANNOT provide pharmacy voucher for patients co-pays  5.  Patients can then  the prescription on their way out of the hospital at discharge, or pharmacy can deliver to the bedside if staff is available. (payment due at time of pick-up or delivery - cash, check, or card accepted)     Able to afford home medications/ co-pay costs: Yes    ADLS:  Current PT AM-PAC Score:   /24  Current OT AM-PAC Score:   /24      DISCHARGE Disposition: Home- No Services Needed    LOC at discharge: Not Applicable  LYNETTE Completed: Not Indicated    Notification completed in HENS/PAS?:  Not Applicable    IMM Completed:   Yes, Case management has presented and reviewed IMM letter #2 to the patient and/or family/ POA. Patient and/or family/POA verbalized understanding of their medicare rights and appeal process if needed. Patient and/or family/POA has signed, initialed and placed today's date (10/26/20) and time ([phone]) on IMM letter #2 on the the appropriate lines. Patient and/or family/POA, copy of letter offered and they are aware that this original copy of IMM letter #2 is available prior to discharge from the paper chart on the unit. Electronic documentation has been entered into epic for IMM letter #2 and original paper copy has been added to the paper chart at the nurses station. Transportation:  Transportation PLAN for discharge: family wife Mekhi Neville will drive home  Mode of Transport: [employer] ordered at discharge: Not [address]: Not Applicable  Orders faxed: No    Durable Medical Equipment:  DME Provider: none  Equipment obtained during hospitalization:     Home Oxygen and Respiratory Equipment:  Oxygen needed at discharge?: Not [address]: Not Applicable  Portable tank available for discharge?: Not Indicated    Dialysis:  Dialysis patient: No    Dialysis Center:  Not Applicable      Additional CM Notes: Pt from home with wife, will return home. Wife is Nurse so can care for [facility] LORI, nurse to provide [facility] bag and syringe to remove FC as directed. Pt and wife do not want HHC at [place].     The Plan for Transition of Care is related to the following treatment goals of Recurrent incisional hernia with incarceration [K43.0]  Incisional hernia, incarcerated [K43.0]    The Patient and/or patient representative Kanchan Mena and his family were provided with a choice of provider and agrees with the discharge plan Yes    Freedom of choice list was provided with basic dialogue that supports the patient's individualized plan of care/goals and shares the quality data associated with the providers.  Not Indicated    Care Transitions patient: Yes    Andria Mahmood RN  [facility]  Case Management Department  Ph: [phone]  Fax: [phone]

## 2020-10-26 NOTE — PROGRESS NOTES
Nephrology Consult Note                                                                                                                                                                                                                                                                                                                                                               Office : [phone]     Fax :[phone]              Patient's Name: Gita Gonzalez  9:38 AM  10/26/2020    Reason for Consult: YEIMY  Requesting Physician:  TORITO Knott CNP      Chief Complaint: Recurrent incisional hernia with incarceration     History of Present Ilness:    Gita Gonzalez is a 78 y.o. male s/p repair of recurrent incisional hernia with incarceration. Consulted for YEIMY. Interim Hx:  Patient is doing well. NAD. No complaints this AM. Only some soreness in the left abdomen.      Current Medications:    potassium phosphate 30 mmol in dextrose 5 % 250 mL IVPB, Once  atorvastatin (LIPITOR) tablet 10 mg, QPM  b complex vitamins capsule 1 capsule, Daily  docusate sodium (COLACE) capsule 100 mg, BID  glipiZIDE (GLUCOTROL) tablet 20 mg, BID  hydroCHLOROthiazide (MICROZIDE) capsule 12.5 mg, Daily  metFORMIN (GLUCOPHAGE) tablet 1,000 mg, BID WC  sodium chloride (Inhalant) 3 % nebulizer solution 15 mL, Q4H WA  aspirin EC tablet 81 mg, Daily  diclofenac sodium (VOLTAREN) 1 % gel 2 g, BID PRN  pantoprazole (PROTONIX) tablet 40 mg, QAM AC  methocarbamol (ROBAXIN) tablet 500 mg, TID  dextrose 50 % IV solution, PRN  insulin regular (HUMULIN R;NOVOLIN R) injection 0-15 Units, 4x Daily AC & HS  glucose (GLUTOSE) 40 % oral gel 15 g, PRN  dextrose 50 % IV solution, PRN  glucagon (rDNA) injection 1 mg, PRN  dextrose 5 % solution, PRN  acetaminophen (TYLENOL) tablet 1,000 mg, Q6H  heparin (porcine) injection 5,000 Units, 3 times per day  ipratropium-albuterol (DUONEB) nebulizer solution 1 ampule, Q4H WA  melatonin disintegrating tablet 5 mg, Daily  tamsulosin (FLOMAX) capsule 0.4 mg, Daily  sodium chloride flush 0.9 % injection 10 mL, 2 times per day  sodium chloride flush 0.9 % injection 10 mL, PRN  ondansetron (ZOFRAN-ODT) disintegrating tablet 4 mg, Q8H PRN    Or  ondansetron (ZOFRAN) injection 4 mg, Q6H PRN  oxyCODONE (ROXICODONE) immediate release tablet 5 mg, Q4H PRN    Or  oxyCODONE (ROXICODONE) immediate release tablet 10 mg, Q4H PRN  sodium chloride (PF) 0.9 % injection 10 mL, Daily  albuterol (PROVENTIL) nebulizer solution 2.5 mg, Q6H PRN  budesonide (PULMICORT) nebulizer suspension 500 mcg, BID  Arformoterol Tartrate (BROVANA) nebulizer solution 15 mcg, BID        Review of Systems:   14 point ROS obtained but were negative except mentioned in HPI      Physical exam:     Vitals:  BP 97/62   Pulse 89   Temp 99.1 °F (37.3 °C) (Oral)   Resp 18   SpO2 92%   Constitutional:  OAA X3 NAD  Skin: no rash, turgor wnl  Heent:  eomi, mmm  Neck: no bruits or jvd noted  Cardiovascular:  S1, S2 without m/r/g  Respiratory: CTA B without w/r/r  Abdomen:  +bs, soft, some discomfort to palpation of the LLQ  Ext: no lower extremity edema  Psychiatric: mood and affect appropriate  Musculoskeletal:  Rom, muscular strength intact    Data:   Labs:  CBC:   Recent Labs     10/24/20  0932 10/25/20  0521 10/25/20  1459 10/26/20  0459   WBC 11.1* 7.7  --  8.2   HGB 12.3* 9.5* 9.7* 9.1*    195  --  192     BMP:    Recent Labs     10/24/20  1504 10/25/20  0521 10/26/20  0458   * 135* 139   K 4.2 3.7 4.0    102 106   CO2 22 24 24   BUN 26* 24* 16   CREATININE 1.6* 1.3 1.0   GLUCOSE 189* 163* 83     Ca/Mg/Phos:   Recent Labs     10/24/20  0932 10/24/20  1504 10/25/20  0521 10/26/20  0458   CALCIUM 8.4 7.9* 7.5* 7.6*   MG 1.40*  --  2.20 1.90   PHOS 3.8 3.5 2.3* 1.5*     Hepatic: No results for input(s): AST, ALT, ALB, BILITOT, ALKPHOS in the last 72 hours. Troponin: No results for input(s): TROPONINI in the last 72 hours.   BNP: No results for input(s): BNP in the last 72 hours. Lipids: No results for input(s): CHOL, TRIG, HDL, LDLCALC, LABVLDL in the last 72 hours. ABGs: No results for input(s): PHART, PO2ART, YBN4GBH in the last 72 hours. INR: No results for input(s): INR in the last 72 hours. UA:No results for input(s): Laurena Revering, GLUCOSEU, BILIRUBINUR, Sherian Osgood, BLOODU, PHUR, PROTEINU, UROBILINOGEN, NITRU, LEUKOCYTESUR, LABMICR, URINETYPE in the last 72 hours. Urine Microscopic: No results for input(s): LABCAST, BACTERIA, COMU, HYALCAST, WBCUA, RBCUA, EPIU in the last 72 hours. Urine Culture: No results for input(s): LABURIN in the last 72 hours. Urine Chemistry: No results for input(s): Charwellingtone Mora, PROTEINUR, NAUR in the last 72 hours. Assessment/Plan   1. YEIMY sec to BP drop    2. Anemia    3. Acid- base/ Electrolyte imbalance       Plan:  - YEIMY sec to BP drop, now resolved  - Cr improved  - UOP better  - Replenish Phos    Thank you for allowing us to participate in care of Brayan Hartman, MS4, acted as scribe.   10/26/2020  09:38

## 2020-10-26 NOTE — PLAN OF CARE
Problem: Falls - Risk of:  Goal: Will remain free from falls  Description: Will remain free from falls  10/26/2020 1043 by Eric Padilla RN  Outcome: Met This Shift  Note: Pt remained free of falls. Bed alarm remains armed and bed in lowest position.  Call light within reach and room door open

## 2020-10-27 ENCOUNTER — CARE COORDINATION (OUTPATIENT)
Dept: CASE MANAGEMENT | Age: 70
End: 2020-10-27

## 2020-10-27 NOTE — DISCHARGE SUMMARY
Discharge Summary    Patient:  Camden Merchant    Admit Date: 10/23/2020 11:43 AM    Discharge Date: 10/26/2020    Admitting Physician: Radha Ferris DO     Discharge Physician: Same    Admitting Diagnosis: Recurrent incisional hernia with incarceration    Discharge Diagnosis: same  Past Medical History:   Diagnosis Date    [facility]     Asthma     BPH (benign prostatic hyperplasia)     [facility]     Hypertension         Hospital Course: Patient was admitted after undergoing robotic recurrent incarcerated incisional hernia repair with mesh, bilateral posterior component separation, cystoscopy, urethral dilatation, and the placement of a 16 Albanian coudé catheter on 10/23. Patient tolerated the procedure well without any immediate complications. 10/24 -diet was advanced to clear liquids and medications were switched to oral formulation. Urology is following after urethral dilation and placement of a critical Harvey in the operating room. Per urology the Harvey will need to stay for 7 days. Patient also developed YEIMY, nephrology has been consulted. 10/25 -patient continues to work on ambulation, hemoglobin is down trending. Awaiting stabilization of hemoglobin in order to restart anticoagulation. Tolerating PO.     10/26 -continues to tolerate PO, passing flatus, on room air, ambulating. Creatinine improving, discontinued IV fluids. Patient was discharged home in stable condition with a prescription for pain medication and stool softeners. He was instructed to follow-up with Dr. Lonnie Bailey in 2 weeks. He was instructed to remove the Harvey on Wednesday and follow-up with his urologist Dr. Wallie Olszewski.       Disposition:  Home    Condition at discharge:  Stable    Discharge Instructions:  See separate form    Yani Forte MD  10/27/2020  4:06 AM pt intubated sedated unable to assess

## 2020-10-27 NOTE — CONSULTS
Nephrology Consult Note                                                                                                                                                                                                                                                                                                                                                               Office : [phone]     Fax :[phone]              Patient's Name: Javier Johnston    Reason for Consult: YEIMY   Requesting Physician:  TORITO Kim CNP      Chief Complaint:      History of Present Ilness:    Patient was admitted after undergoing robotic recurrent incarcerated incisional hernia repair with mesh, bilateral posterior component separation, cystoscopy, urethral dilatation, and the placement of a 16 English coudé catheter on 10/23. Patient tolerated the procedure well   Cr worse   Bp drop noted . Past Medical History:   Diagnosis Date    Acute renal failure (ARF) ([address]     Asthma     BPH (benign prostatic hyperplasia)     Diabetes mellitus, type 2 ([address]     Hypertension        Past Surgical History:   Procedure Laterality Date    APPENDECTOMY      COLONOSCOPY      CYSTOSCOPY      CYSTOSCOPY  07/26/2017    cysto, bilateral stent exchange, left retrograde pyelogram    HERNIA REPAIR      x 4    OTHER SURGICAL HISTORY      bladder diverticuli excision    TURP      UMBILICAL HERNIA REPAIR N/A 10/23/2020    ROBOTIC RECURRENT INCARCERATED, INCISIONAL  HERNIA REPAIR WITH MESH, BILATERAL POSTERIOR COMPONENT SEPARATION;  CYSTOSCOPY, FLEXIBLE CYSTOSCOPY, URETHRAL DILATATION, 16 FR COUDE CATHETER PLACEMENT performed by Jessica Serna DO at [address] OR       Family History   Problem Relation Age of Onset    Dementia Mother     Stroke Father         reports that he has never smoked. He has never used smokeless tobacco. He reports current alcohol use. Allergies:  Patient has no known allergies.     Current Medications:    No current facility-administered medications for this encounter. Review of Systems:   14 point ROS obtained but were negative except mentioned in HPI      Physical exam:     Vitals:  BP 97/62   Pulse 89   Temp 99.1 °F (37.3 °C) (Oral)   Resp 18   SpO2 92%   Constitutional:  OAA X3 NAD  Skin: no rash, turgor wnl  Heent:  eomi, mmm  Neck: no bruits or jvd noted  Cardiovascular:  S1, S2 without m/r/g  Respiratory: CTA B without w/r/r  Abdomen:  +bs, soft, nt, nd  Ext: + lower extremity edema  Psychiatric: mood and affect appropriate  Musculoskeletal:  Rom, muscular strength intact    Data:   Labs:  CBC:   Recent Labs     10/24/20  0932 10/25/20  0521 10/25/20  1459 10/26/20  0459   WBC 11.1* 7.7  --  8.2   HGB 12.3* 9.5* 9.7* 9.1*    195  --  192     BMP:    Recent Labs     10/24/20  1504 10/25/20  0521 10/26/20  0458   * 135* 139   K 4.2 3.7 4.0    102 106   CO2 22 24 24   BUN 26* 24* 16   CREATININE 1.6* 1.3 1.0   GLUCOSE 189* 163* 83     Ca/Mg/Phos:   Recent Labs     10/24/20  0932 10/24/20  1504 10/25/20  0521 10/26/20  0458   CALCIUM 8.4 7.9* 7.5* 7.6*   MG 1.40*  --  2.20 1.90   PHOS 3.8 3.5 2.3* 1.5*     Hepatic: No results for input(s): AST, ALT, ALB, BILITOT, ALKPHOS in the last 72 hours. Troponin: No results for input(s): TROPONINI in the last 72 hours. BNP: No results for input(s): BNP in the last 72 hours. Lipids: No results for input(s): CHOL, TRIG, HDL, LDLCALC, LABVLDL in the last 72 hours. ABGs: No results for input(s): PHART, PO2ART, FTT3INJ in the last 72 hours. INR: No results for input(s): INR in the last 72 hours. UA:No results for input(s): [place] , GLUCOSEU, BILIRUBINUR, Medardo Cerrato, BLOODU, PHUR, PROTEINU, UROBILINOGEN, NITRU, LEUKOCYTESUR, LABMICR, URINETYPE in the last 72 hours. Urine Microscopic: No results for input(s): LABCAST, BACTERIA, COMU, HYALCAST, WBCUA, RBCUA, EPIU in the last 72 hours.   Urine Culture: No results for input(s): LABURIN in the last 72 hours. Urine Chemistry: No results for input(s): Randolm Reins, PROTEINUR, NAUR in the last 72 hours. IMAGING:  No orders to display       Assessment/Plan   1. YEIMY     2. HTN    3. Anemia    4. Acid- base/ Electrolyte imbalance     5.  Incarcerated hernia     Plan       - YEIMY sec to BP drop   - UO better  - Ur studies  - expect Cr to get better   - BP controlled   - adv diet per sx                 Thank you for allowing us to participate in care of Raven Jake  Feel free to contact me   [facility] [address]  Office : [phone]  Fax :[phone]

## 2020-10-27 NOTE — PROGRESS NOTES
Nephrology Consult Note                                                                                                                                                                                                                                                                                                                                                               Office : [phone]     Fax :[phone]              Patient's Name: Adelita Servin    Reason for Consult: YEIMY  Requesting Physician:  TORITO Vanessa CNP      Chief Complaint: Recurrent incisional hernia with incarceration     History of Present Ilness:    Adelita Servin is a 78 y.o. male s/p repair of recurrent incisional hernia with incarceration. Consulted for YEMIY. Interim Hx:  Patient is doing well. NAD. No complaints this AM. Only some soreness in the left abdomen. Current Medications:    No current facility-administered medications for this encounter.        Review of Systems:   14 point ROS obtained but were negative except mentioned in HPI      Physical exam:     Vitals:  BP 97/62   Pulse 89   Temp 99.1 °F (37.3 °C) (Oral)   Resp 18   SpO2 92%   Constitutional:  OAA X3 NAD  Skin: no rash, turgor wnl  Heent:  eomi, mmm  Neck: no bruits or jvd noted  Cardiovascular:  S1, S2 without m/r/g  Respiratory: CTA B without w/r/r  Abdomen:  +bs, soft, some discomfort to palpation of the LLQ  Ext: no lower extremity edema  Psychiatric: mood and affect appropriate  Musculoskeletal:  Rom, muscular strength intact    Data:   Labs:  CBC:   Recent Labs     10/24/20  0932 10/25/20  0521 10/25/20  1459 10/26/20  0459   WBC 11.1* 7.7  --  8.2   HGB 12.3* 9.5* 9.7* 9.1*    195  --  192     BMP:    Recent Labs     10/24/20  1504 10/25/20  0521 10/26/20  0458   * 135* 139   K 4.2 3.7 4.0    102 106   CO2 22 24 24   BUN 26* 24* 16   CREATININE 1.6* 1.3 1.0   GLUCOSE 189* 163* 83     Ca/Mg/Phos:   Recent Labs 10/24/20  0932 10/24/20  1504 10/25/20  0521 10/26/20  0458   CALCIUM 8.4 7.9* 7.5* 7.6*   MG 1.40*  --  2.20 1.90   PHOS 3.8 3.5 2.3* 1.5*     Hepatic: No results for input(s): AST, ALT, ALB, BILITOT, ALKPHOS in the last 72 hours. Troponin: No results for input(s): TROPONINI in the last 72 hours. BNP: No results for input(s): BNP in the last 72 hours. Lipids: No results for input(s): CHOL, TRIG, HDL, LDLCALC, LABVLDL in the last 72 hours. ABGs: No results for input(s): PHART, PO2ART, WPY0HDS in the last 72 hours. INR: No results for input(s): INR in the last 72 hours. UA:No results for input(s): Heather Pen, GLUCOSEU, BILIRUBINUR, Wyline Bang, BLOODU, PHUR, PROTEINU, UROBILINOGEN, NITRU, LEUKOCYTESUR, LABMICR, URINETYPE in the last 72 hours. Urine Microscopic: No results for input(s): LABCAST, BACTERIA, COMU, HYALCAST, WBCUA, RBCUA, EPIU in the last 72 hours. Urine Culture: No results for input(s): LABURIN in the last 72 hours. Urine Chemistry: No results for input(s): Alejandrina Joy, PROTEINUR, NAUR in the last 72 hours. Assessment/Plan   1. YEIMY sec to BP drop    2.  Anemia    3. Acid- base/ Electrolyte imbalance       Plan:  - YEIMY sec to BP drop, now resolved  - Cr improved  - UOP better  - Replenish Phos    Thank you for allowing us to participate in care of Hoang Alston

## 2020-10-27 NOTE — CARE COORDINATION
Tanya 45 Transitions Initial Follow Up Call    Call within 2 business days of discharge: Yes    Patient:  Rob Section  Patient :  1950  MRN:  [id number]   Reason for Admission: Incisional Hernia Incarcerated   Discharge Date:  10/26/20  RARS:  Jayant      Non-face-to-face services provided:    1ST CTC attempt to reach Pt regarding recent hospital discharge. CTC left voice recording with call back number requesting a call back.     Follow up appointments:    Future Appointments   Date Time Provider Earline Kim   2020  1:45 PM Ronaldo Sandra DO [facility]   2020  2:15 PM Haleigh Hussein, APRN - CNP [facility]ivia [facility]       Thank Zuri Abel RN  Care Transition Coordinator  Contact [facility][phone]

## 2020-10-28 ENCOUNTER — CARE COORDINATION (OUTPATIENT)
Dept: CASE MANAGEMENT | Age: 70
End: 2020-10-28

## 2020-10-28 PROCEDURE — 1111F DSCHRG MED/CURRENT MED MERGE: CPT | Performed by: NURSE PRACTITIONER

## 2020-10-28 NOTE — CARE COORDINATION
Tanya 45 Transitions Initial Follow Up Call    Call within 2 business days of discharge: Yes    Patient: Liv Elizabeth Patient : 1950   MRN: [id number]   Reason for Admission: Hernia Repair   Discharge Date: 10/26/20 RARS: Readmission Risk Score: 16      Last Discharge [facility]       Complaint Diagnosis Description Type Department Provider    10/23/20  Recurrent incisional hernia with incarceration Admission (Discharged) [facility],            Spoke with: [address]: [facility]     Non-face-to-face services provided:  Obtained and reviewed discharge summary and/or continuity of care documents  Education of patient/family/caregiver/guardian to support self-management-. Assessment and support for treatment adherence and medication management-.    Care Transitions 24 Hour Call    Schedule Follow Up Appointment with PCP:  Declined  Do you have any ongoing symptoms?:  Yes  Patient-reported symptoms:  Pain  Do you have a copy of your discharge instructions?:  Yes  Do you have all of your prescriptions and are they filled?:  Yes  Have you been contacted by a [facility] Pharmacist?:  No  Have you scheduled your follow up appointment?:  Yes  How are you going to get to your appointment?:  Car - family or friend to transport  Were you discharged with any Home Care or Post Acute Services:  No  Do you feel like you have everything you need to keep you well at home?:  Yes  Care Transitions Interventions  No Identified Needs       Summary  CTN spoke with patient this am for initial 24 hour discharge follow up CTN call. Patient states he is doing okay, no reports of any nausea, vomiting, fevers, chills, SOB or Cough. No difficulty with urination or appetite. Patient has not had a BM, but is passing gas. CTN encouraged patient to take stool softener, increase water intake, and increase intake of green, leafy fruits and vegetables.   Patient states pain is present, states he is taking PRN's sporadically. Instructed patient to take pain medication as prescribed, around the clock for at least first 24-48 hours to help stay ahead of pain. CTN and Patient completed 1111f, all medications filled and in home. CTN provided education on s/s that require medical attention and when to seek medical attention. CTN advised Pt of use of urgent care or physicians 24 hr access line if assistance is needed after hours or weekend. Pt denies any needs or concerns and is agreeable with additional calls.     Follow Up  Future Appointments   Date Time Provider Earline Kim   11/4/2020  1:45 PM Kenyatta Dallas DO [facility] KRISTY   11/18/2020  2:15 PM May TORITO Hamilton - OLY Herrera RN

## 2020-10-28 NOTE — CARE COORDINATION
Tanya 45 Transitions Initial Follow Up Call    Call within 2 business days of discharge: Yes    Patient:  Martha Acosta  Patient :  1950  MRN:  [id number]   Reason for Admission: Hernia Repair   Discharge Date:  10/26/20  RARS:  Jayant      Non-face-to-face services provided:    2ND CTC attempt to reach Pt regarding recent hospital discharge. CTC left voice recording with call back number requesting a call back.     Follow up appointments:    Future Appointments   Date Time Provider Earline Kim   2020  1:45 PM Nataliya Benson DO [facility]   2020  2:15 PM Faustino Manuel, APRN - CNP Magdalena Kunz [facility]       Thank Chandan Donald RN  Care Transition Coordinator  Contact [id number]:[phone]

## 2020-10-29 NOTE — OP NOTE
[facility], [address]                                OPERATIVE REPORT    PATIENT NAME: Maximo Bonilla                     :        1950  MED REC NO:   [id number]                          ROOM:       [id number]  ACCOUNT NO:   [de-identified]                           ADMIT DATE: 10/23/2020  PROVIDER:     David Stuart DO    DATE OF PROCEDURE:  10/23/2020    PREOPERATIVE DIAGNOSIS:  Recurrent incarcerated incisional hernia. POSTOPERATIVE DIAGNOSES:  1. Recurrent incarcerated incisional hernia. 2.  Peritoneal adhesions. OPERATIONS PERFORMED:  1.  Robotic recurrent incarcerated incisional hernia repair. 2.  Bilateral posterior component separation. 3.  Robotic lysis of adhesions. SURGEON:  David Stuart DO    ASSISTANT:  Belkis GUERIN ASSISTANT:  Jin Salas MD    ANESTHESIA:  General endotracheal.    COMPLICATIONS:  None. CONDITION:  Stable. ESTIMATED BLOOD LOSS:  25 mL. INDICATION FOR PROCEDURE:  The patient is a 29-year-old gentleman who  had had four previous hernia repairs who presented with symptoms of  abdominal pain with a Swiss cheese appearing hernia on CT scan with a  large loop of small intestine incarcerated in the largest defect. He  was brought and consented for robotic possible open repair after  understanding all risks, benefits, and alternatives of the procedure. DESCRIPTION OF PROCEDURE:  The patient was brought to the operative  suite, laid in supine position with arms outstretched and after  induction of general endotracheal anesthesia, tube was confirmed to be  in place with end-tidal CO2 and secured. All pressure points well  padded and the patient was well secured. Harvey catheter was inserted  with some difficulty and a coude was eventually needed with help from  urology, who also performed a cystoscopy and urethral dilatation.    Please see their note for full operative report. A small incision was made at the left midclavicular line using the  Veress needle. Abdomen was entered and pneumoperitoneum established  with 15 mm of CO2. A 5-mm, 30-degree camera housed and a 5-mm Optiview  trocar was used to enter the abdomen under direct visualization. Once  inside the abdomen, three additional trocars were placed on the  patient's right side and robot was docked. The next, approximately 40 minutes were spent lysing adhesions. There  were dense peritoneal adhesions from the omentum to the anterior  abdominal wall and a single loop of intestine was quite adhered within  the hernia sac inside of the largest defect. This was very meticulously  teased off and , so as not to injure the intestine. Once this  was accomplished, attention was paid first to component separation. The right-sided posterior component separation was begun by finding the  right rectus muscle and defining this at the lateral junction where it  met the oblique. A spot approximately 10 mm medial to the lateral  border was incised and a posterior sheath was incised to visualize  muscle. This plane was entered and extended far superior and far  inferior to the hernia defects. This avascular plane was extended all  the way to the medial border of the right rectus muscle. This was done  in a hemostatic fashion and minimal bleeding was controlled with the use  of fenestrated bipolar device. Once this was accomplished and the plane  was completely created, this plane was then dropped into the  preperitoneal plane along linea alba to connect to the other side and  cross over through the midline. This plane was then again established  superiorly to inferiorly. This concluded our right-sided myofascial release.   (MYOFASCIAL RELEASE #1)    At this point, the left-sided rectus muscle was appreciated and a spot  approximately 1 cm lateral to the medial border of the rectus muscle was  appreciated and the posterior sheath was entered and the plane was  connected from the right-sided posterior rectus plane to the left side. This plane was extended far superior and inferior to the hernia defects  to accommodate a large piece of mesh. This avascular plane was then  extended all the way to the lateral border of the left rectus muscle  just medial to the neurovascular bundle. All small vessels were  controlled with fenestrated bipolar and this plane was then established  to not only take tension off the midline repair, but also to create a  plane for our mesh. This concluded our left-sided myofascial release. (MYOFASCIAL RELEASE #2)    At that point, a #1 Stratafix suture was used to close the midline  fascial defect and this approximated without tension. Once this was  accomplished due to the peritoneal holes, a 2-0 Stratafix suture was  used to close the peritoneal rent as much as we could and a 25 x 20 cm  Ventralight ST mesh was introduced into the retrorectus space and  absorbable tackers were used to fixate it circumferentially. Once this  was accomplished, the posterior sheath was then re-approximated and also  fixated using the absorbable tacking device. Hemostasis was maintained. All ports were inspected for hemostasis. Pneumoperitoneum evacuated. All sponge, needle, instrument counts were correct x2. The patient was brought to the postanesthesia care unit in stable  condition. I personally informed the patient's wife of the findings of the  operation.         Alysha Hurst DO    D: 10/28/2020 13:11:31       T: 10/28/2020 14:02:50     [id number]  Job#: [id number]     Doc#: [id number]    CC:

## 2020-11-03 ENCOUNTER — TELEPHONE (OUTPATIENT)
Dept: BARIATRICS/WEIGHT MGMT | Age: 70
End: 2020-11-03

## 2020-11-03 NOTE — TELEPHONE ENCOUNTER
Spoke to Dr Crystal Handley via text. Dr Crystal Handley asked if we could call the urologist for pt to see if we can get him in to see the drArnoldo West was able to see PA today in [facility], pt is going to go there.

## 2020-11-04 ENCOUNTER — CARE COORDINATION (OUTPATIENT)
Dept: CASE MANAGEMENT | Age: 70
End: 2020-11-04

## 2020-11-04 NOTE — CARE COORDINATION
[facility] Transitions Initial Follow Up Call    Call within 2 business days of discharge: Yes    Patient:  Mago Zuñiga  Patient :  1950  MRN:  [id number]   Reason for Admission: Hernia Repair    Discharge Date:  10/26/20  RARS:       Non-face-to-face services provided:    1ST CTC attempt to reach Pt regarding recent hospital discharge. CTC left voice recording with call back number requesting a call back.     Follow up appointments:    Future Appointments   Date Time Provider Earline Kim   2020  1:45 PM DO Prakash Mei [facility]   2020  2:15 PM Donnell Murray, APRN - CNP [facility]       Thank Lincoln aCstillo RN  Care Transition Coordinator  Contact KYJTXI:[phone]

## 2020-11-11 ENCOUNTER — CARE COORDINATION (OUTPATIENT)
Dept: CASE MANAGEMENT | Age: 70
End: 2020-11-11

## 2020-11-11 NOTE — CARE COORDINATION
[facility] Transitions Initial Follow Up Call    Call within 2 business days of discharge: Yes    Patient:  Willard Martínez  Patient :  1950  MRN:  [id number]   Reason for Admission: Hiatal Hernia Repair  Discharge Date:  10/26/20  RARS:       Non-face-to-face services provided:    2ND CTC attempt to reach Pt regarding recent hospital discharge. CTC left voice recording with call back number requesting a call back.     Follow up appointments:    Future Appointments   Date Time Provider Earline Kim   2020 12:30 PM DO Robert [facility]   2020  2:15 PM Nicki Merlin, APRN - CNP [facility]       Thank Jose Moya RN  Care Transition Coordinator  Contact BFYNZ:[phone]

## 2020-11-16 ENCOUNTER — CARE COORDINATION (OUTPATIENT)
Dept: CASE MANAGEMENT | Age: 70
End: 2020-11-16

## 2020-11-18 ENCOUNTER — OFFICE VISIT (OUTPATIENT)
Dept: BARIATRICS/WEIGHT MGMT | Age: 70
End: 2020-11-18

## 2020-11-18 VITALS
DIASTOLIC BLOOD PRESSURE: 71 MMHG | WEIGHT: 200.8 LBS | TEMPERATURE: 97.9 F | SYSTOLIC BLOOD PRESSURE: 118 MMHG | BODY MASS INDEX: 32.41 KG/M2 | HEART RATE: 70 BPM

## 2020-11-18 PROCEDURE — 99024 POSTOP FOLLOW-UP VISIT: CPT | Performed by: SURGERY

## 2020-11-21 NOTE — PROGRESS NOTES
Patient doing well  No N/V/F/C  Tolerating diet  Having BM's    Incisions C/D/I    S/P Robotic recurrent hernia repair    No lifting more than 10 pounds for another 2 weeks    F/U PRN    Michael Pandey

## 2023-06-21 NOTE — ASSESSMENT & PLAN NOTE
Patient seen today for follow-up visit     Cedric Crum was present    Patient was sleeping and is bed    Seemed to be somewhat sleepy in the morning but did wake up and did participate in the session patient does acknowledge that he has seen some positive changes    Still may be having some underlying hallucinations that he seems to be minimizing    Going to have a family meeting later today    Continue with reality orientation    Continue with supportive therapy    Continue with olanzapine 15 mg at night which we may want divide tomorrow into doses of 7.5 mg at 6 PM and 7.5 mg at 9 PM if needed, to avoid excessive sedation in the daytime    Encouraged patient to talk about food changes he has worked on and will work on because of the hospitalization secondary to his agitated behavior and\" trying to summary of the family meeting today to share with me in the morning    Continue with reality orientation    Continue with supportive therapy    Continue with medication evaluation and stabilization   Patient will do well with planned procedure  Continue Symbicort daily  Albuterol aerosol treatment en route to OR (in pre-op) and then in recovery room as needed. Hold Metformin or Glypizide morning or surgery.   Monitor blood sugars shanice-operatively and cover with sliding scale insulin  Hold BP medications morning of surgery  Monitor BP shanice-operatively  Tylenol prn  Further orders per surgeon

## 2024-03-15 ENCOUNTER — ANESTHESIA (OUTPATIENT)
Dept: OPERATING ROOM | Age: 74
End: 2024-03-15
Payer: MEDICARE

## 2024-03-15 ENCOUNTER — APPOINTMENT (OUTPATIENT)
Dept: GENERAL RADIOLOGY | Age: 74
DRG: 661 | End: 2024-03-15
Payer: MEDICARE

## 2024-03-15 ENCOUNTER — HOSPITAL ENCOUNTER (INPATIENT)
Age: 74
LOS: 2 days | Discharge: HOME | DRG: 661 | End: 2024-03-17
Attending: EMERGENCY MEDICINE | Admitting: INTERNAL MEDICINE
Payer: MEDICARE

## 2024-03-15 ENCOUNTER — APPOINTMENT (OUTPATIENT)
Dept: CT IMAGING | Age: 74
DRG: 661 | End: 2024-03-15
Payer: MEDICARE

## 2024-03-15 ENCOUNTER — ANESTHESIA EVENT (OUTPATIENT)
Dept: OPERATING ROOM | Age: 74
End: 2024-03-15
Payer: MEDICARE

## 2024-03-15 DIAGNOSIS — N17.9 ACUTE RENAL FAILURE, UNSPECIFIED ACUTE RENAL FAILURE TYPE (HCC): Primary | ICD-10-CM

## 2024-03-15 DIAGNOSIS — N39.0 URINARY TRACT INFECTION WITHOUT HEMATURIA, SITE UNSPECIFIED: ICD-10-CM

## 2024-03-15 DIAGNOSIS — N20.1 URETEROLITHIASIS: ICD-10-CM

## 2024-03-15 DIAGNOSIS — N13.30 HYDRONEPHROSIS OF RIGHT KIDNEY: ICD-10-CM

## 2024-03-15 LAB
ALBUMIN SERPL-MCNC: 3.5 G/DL (ref 3.4–5)
ALBUMIN/GLOB SERPL: 0.9 {RATIO} (ref 1.1–2.2)
ALP SERPL-CCNC: 117 U/L (ref 40–129)
ALT SERPL-CCNC: 13 U/L (ref 10–40)
ANION GAP SERPL CALCULATED.3IONS-SCNC: 10 MMOL/L (ref 3–16)
AST SERPL-CCNC: 14 U/L (ref 15–37)
BASOPHILS # BLD: 0.1 K/UL (ref 0–0.2)
BASOPHILS NFR BLD: 0.9 %
BILIRUB SERPL-MCNC: 0.3 MG/DL (ref 0–1)
BILIRUB UR QL STRIP.AUTO: NEGATIVE
BUN SERPL-MCNC: 50 MG/DL (ref 7–20)
CALCIUM SERPL-MCNC: 9.4 MG/DL (ref 8.3–10.6)
CHLORIDE SERPL-SCNC: 98 MMOL/L (ref 99–110)
CLARITY UR: ABNORMAL
CO2 SERPL-SCNC: 24 MMOL/L (ref 21–32)
COLOR UR: YELLOW
CREAT SERPL-MCNC: 3.4 MG/DL (ref 0.8–1.3)
DEPRECATED RDW RBC AUTO: 14.6 % (ref 12.4–15.4)
EKG ATRIAL RATE: 58 BPM
EKG DIAGNOSIS: NORMAL
EKG P AXIS: -6 DEGREES
EKG P-R INTERVAL: 144 MS
EKG Q-T INTERVAL: 408 MS
EKG QRS DURATION: 94 MS
EKG QTC CALCULATION (BAZETT): 400 MS
EKG R AXIS: 9 DEGREES
EKG T AXIS: 1 DEGREES
EKG VENTRICULAR RATE: 58 BPM
EOSINOPHIL # BLD: 0.3 K/UL (ref 0–0.6)
EOSINOPHIL NFR BLD: 4.9 %
EPI CELLS #/AREA URNS HPF: ABNORMAL /HPF (ref 0–5)
GFR SERPLBLD CREATININE-BSD FMLA CKD-EPI: 18 ML/MIN/{1.73_M2}
GLUCOSE BLD-MCNC: 143 MG/DL (ref 70–99)
GLUCOSE SERPL-MCNC: 144 MG/DL (ref 70–99)
GLUCOSE UR STRIP.AUTO-MCNC: NEGATIVE MG/DL
HCT VFR BLD AUTO: 42.7 % (ref 40.5–52.5)
HGB BLD-MCNC: 14.3 G/DL (ref 13.5–17.5)
HGB UR QL STRIP.AUTO: ABNORMAL
KETONES UR STRIP.AUTO-MCNC: NEGATIVE MG/DL
LEUKOCYTE ESTERASE UR QL STRIP.AUTO: ABNORMAL
LYMPHOCYTES # BLD: 1.9 K/UL (ref 1–5.1)
LYMPHOCYTES NFR BLD: 27.1 %
MCH RBC QN AUTO: 29.9 PG (ref 26–34)
MCHC RBC AUTO-ENTMCNC: 33.4 G/DL (ref 31–36)
MCV RBC AUTO: 89.5 FL (ref 80–100)
MONOCYTES # BLD: 0.9 K/UL (ref 0–1.3)
MONOCYTES NFR BLD: 12.2 %
NEUTROPHILS # BLD: 3.9 K/UL (ref 1.7–7.7)
NEUTROPHILS NFR BLD: 54.9 %
NITRITE UR QL STRIP.AUTO: NEGATIVE
PERFORMED ON: ABNORMAL
PH UR STRIP.AUTO: 6 [PH] (ref 5–8)
PLATELET # BLD AUTO: 277 K/UL (ref 135–450)
PMV BLD AUTO: 7.3 FL (ref 5–10.5)
POTASSIUM SERPL-SCNC: 5.3 MMOL/L (ref 3.5–5.1)
POTASSIUM SERPL-SCNC: 5.4 MMOL/L (ref 3.5–5.1)
POTASSIUM SERPL-SCNC: 5.9 MMOL/L (ref 3.5–5.1)
PROT SERPL-MCNC: 7.4 G/DL (ref 6.4–8.2)
PROT UR STRIP.AUTO-MCNC: 30 MG/DL
RBC # BLD AUTO: 4.77 M/UL (ref 4.2–5.9)
RBC #/AREA URNS HPF: ABNORMAL /HPF (ref 0–4)
SODIUM SERPL-SCNC: 132 MMOL/L (ref 136–145)
SP GR UR STRIP.AUTO: 1.02 (ref 1–1.03)
UA COMPLETE W REFLEX CULTURE PNL UR: YES
UA DIPSTICK W REFLEX MICRO PNL UR: YES
URN SPEC COLLECT METH UR: ABNORMAL
UROBILINOGEN UR STRIP-ACNC: 0.2 E.U./DL
WBC # BLD AUTO: 7.1 K/UL (ref 4–11)
WBC #/AREA URNS HPF: >100 /HPF (ref 0–5)
YEAST URNS QL MICRO: PRESENT /HPF

## 2024-03-15 PROCEDURE — 93010 ELECTROCARDIOGRAM REPORT: CPT | Performed by: INTERNAL MEDICINE

## 2024-03-15 PROCEDURE — A4217 STERILE WATER/SALINE, 500 ML: HCPCS | Performed by: UROLOGY

## 2024-03-15 PROCEDURE — 6360000002 HC RX W HCPCS: Performed by: NURSE ANESTHETIST, CERTIFIED REGISTERED

## 2024-03-15 PROCEDURE — 2060000000 HC ICU INTERMEDIATE R&B

## 2024-03-15 PROCEDURE — 36415 COLL VENOUS BLD VENIPUNCTURE: CPT

## 2024-03-15 PROCEDURE — C2617 STENT, NON-COR, TEM W/O DEL: HCPCS | Performed by: UROLOGY

## 2024-03-15 PROCEDURE — 6370000000 HC RX 637 (ALT 250 FOR IP): Performed by: INTERNAL MEDICINE

## 2024-03-15 PROCEDURE — 2500000003 HC RX 250 WO HCPCS: Performed by: NURSE ANESTHETIST, CERTIFIED REGISTERED

## 2024-03-15 PROCEDURE — 2580000003 HC RX 258: Performed by: NURSE PRACTITIONER

## 2024-03-15 PROCEDURE — 85025 COMPLETE CBC W/AUTO DIFF WBC: CPT

## 2024-03-15 PROCEDURE — 2580000003 HC RX 258: Performed by: INTERNAL MEDICINE

## 2024-03-15 PROCEDURE — 93005 ELECTROCARDIOGRAM TRACING: CPT | Performed by: NURSE PRACTITIONER

## 2024-03-15 PROCEDURE — 2580000003 HC RX 258: Performed by: NURSE ANESTHETIST, CERTIFIED REGISTERED

## 2024-03-15 PROCEDURE — 6360000002 HC RX W HCPCS: Performed by: UROLOGY

## 2024-03-15 PROCEDURE — 99285 EMERGENCY DEPT VISIT HI MDM: CPT

## 2024-03-15 PROCEDURE — 74018 RADEX ABDOMEN 1 VIEW: CPT

## 2024-03-15 PROCEDURE — 3600000004 HC SURGERY LEVEL 4 BASE: Performed by: UROLOGY

## 2024-03-15 PROCEDURE — 2709999900 HC NON-CHARGEABLE SUPPLY: Performed by: UROLOGY

## 2024-03-15 PROCEDURE — 80053 COMPREHEN METABOLIC PANEL: CPT

## 2024-03-15 PROCEDURE — 6360000004 HC RX CONTRAST MEDICATION: Performed by: UROLOGY

## 2024-03-15 PROCEDURE — 81001 URINALYSIS AUTO W/SCOPE: CPT

## 2024-03-15 PROCEDURE — 87086 URINE CULTURE/COLONY COUNT: CPT

## 2024-03-15 PROCEDURE — BT1D1ZZ FLUOROSCOPY OF RIGHT KIDNEY, URETER AND BLADDER USING LOW OSMOLAR CONTRAST: ICD-10-PCS | Performed by: UROLOGY

## 2024-03-15 PROCEDURE — 6360000002 HC RX W HCPCS: Performed by: ANESTHESIOLOGY

## 2024-03-15 PROCEDURE — 7100000000 HC PACU RECOVERY - FIRST 15 MIN: Performed by: UROLOGY

## 2024-03-15 PROCEDURE — 3600000014 HC SURGERY LEVEL 4 ADDTL 15MIN: Performed by: UROLOGY

## 2024-03-15 PROCEDURE — 0T768DZ DILATION OF RIGHT URETER WITH INTRALUMINAL DEVICE, VIA NATURAL OR ARTIFICIAL OPENING ENDOSCOPIC: ICD-10-PCS | Performed by: UROLOGY

## 2024-03-15 PROCEDURE — 6360000002 HC RX W HCPCS: Performed by: INTERNAL MEDICINE

## 2024-03-15 PROCEDURE — 87102 FUNGUS ISOLATION CULTURE: CPT

## 2024-03-15 PROCEDURE — 84132 ASSAY OF SERUM POTASSIUM: CPT

## 2024-03-15 PROCEDURE — 3700000000 HC ANESTHESIA ATTENDED CARE: Performed by: UROLOGY

## 2024-03-15 PROCEDURE — 2580000003 HC RX 258: Performed by: UROLOGY

## 2024-03-15 PROCEDURE — 3700000001 HC ADD 15 MINUTES (ANESTHESIA): Performed by: UROLOGY

## 2024-03-15 PROCEDURE — 74176 CT ABD & PELVIS W/O CONTRAST: CPT

## 2024-03-15 PROCEDURE — C1769 GUIDE WIRE: HCPCS | Performed by: UROLOGY

## 2024-03-15 PROCEDURE — 87205 SMEAR GRAM STAIN: CPT

## 2024-03-15 PROCEDURE — C1758 CATHETER, URETERAL: HCPCS | Performed by: UROLOGY

## 2024-03-15 PROCEDURE — 7100000001 HC PACU RECOVERY - ADDTL 15 MIN: Performed by: UROLOGY

## 2024-03-15 PROCEDURE — 6360000002 HC RX W HCPCS: Performed by: NURSE PRACTITIONER

## 2024-03-15 DEVICE — URETERAL STENT
Type: IMPLANTABLE DEVICE | Site: URETER | Status: FUNCTIONAL
Brand: CONTOUR™

## 2024-03-15 RX ORDER — PROCHLORPERAZINE EDISYLATE 5 MG/ML
10 INJECTION INTRAMUSCULAR; INTRAVENOUS EVERY 6 HOURS PRN
Status: DISCONTINUED | OUTPATIENT
Start: 2024-03-15 | End: 2024-03-17 | Stop reason: HOSPADM

## 2024-03-15 RX ORDER — SODIUM CHLORIDE 0.9 % (FLUSH) 0.9 %
5-40 SYRINGE (ML) INJECTION EVERY 12 HOURS SCHEDULED
Status: DISCONTINUED | OUTPATIENT
Start: 2024-03-15 | End: 2024-03-15 | Stop reason: HOSPADM

## 2024-03-15 RX ORDER — ONDANSETRON 2 MG/ML
4 INJECTION INTRAMUSCULAR; INTRAVENOUS EVERY 6 HOURS PRN
Status: DISCONTINUED | OUTPATIENT
Start: 2024-03-15 | End: 2024-03-17 | Stop reason: HOSPADM

## 2024-03-15 RX ORDER — SODIUM CHLORIDE 0.9 % (FLUSH) 0.9 %
5-40 SYRINGE (ML) INJECTION EVERY 12 HOURS SCHEDULED
Status: DISCONTINUED | OUTPATIENT
Start: 2024-03-15 | End: 2024-03-17 | Stop reason: HOSPADM

## 2024-03-15 RX ORDER — SODIUM CHLORIDE 0.9 % (FLUSH) 0.9 %
5-40 SYRINGE (ML) INJECTION PRN
Status: DISCONTINUED | OUTPATIENT
Start: 2024-03-15 | End: 2024-03-17 | Stop reason: HOSPADM

## 2024-03-15 RX ORDER — ONDANSETRON 2 MG/ML
4 INJECTION INTRAMUSCULAR; INTRAVENOUS EVERY 30 MIN PRN
Status: DISCONTINUED | OUTPATIENT
Start: 2024-03-15 | End: 2024-03-15 | Stop reason: HOSPADM

## 2024-03-15 RX ORDER — GLYCOPYRROLATE 0.2 MG/ML
INJECTION INTRAMUSCULAR; INTRAVENOUS PRN
Status: DISCONTINUED | OUTPATIENT
Start: 2024-03-15 | End: 2024-03-15 | Stop reason: SDUPTHER

## 2024-03-15 RX ORDER — PROPOFOL 10 MG/ML
INJECTION, EMULSION INTRAVENOUS PRN
Status: DISCONTINUED | OUTPATIENT
Start: 2024-03-15 | End: 2024-03-15 | Stop reason: SDUPTHER

## 2024-03-15 RX ORDER — ROCURONIUM BROMIDE 10 MG/ML
INJECTION, SOLUTION INTRAVENOUS PRN
Status: DISCONTINUED | OUTPATIENT
Start: 2024-03-15 | End: 2024-03-15 | Stop reason: SDUPTHER

## 2024-03-15 RX ORDER — SODIUM CHLORIDE 9 MG/ML
INJECTION, SOLUTION INTRAVENOUS PRN
Status: DISCONTINUED | OUTPATIENT
Start: 2024-03-15 | End: 2024-03-15 | Stop reason: HOSPADM

## 2024-03-15 RX ORDER — NALOXONE HYDROCHLORIDE 0.4 MG/ML
INJECTION, SOLUTION INTRAMUSCULAR; INTRAVENOUS; SUBCUTANEOUS PRN
Status: DISCONTINUED | OUTPATIENT
Start: 2024-03-15 | End: 2024-03-15 | Stop reason: HOSPADM

## 2024-03-15 RX ORDER — MECOBALAMIN 5000 MCG
5 TABLET,DISINTEGRATING ORAL NIGHTLY PRN
Status: DISCONTINUED | OUTPATIENT
Start: 2024-03-15 | End: 2024-03-17 | Stop reason: HOSPADM

## 2024-03-15 RX ORDER — DEXAMETHASONE SODIUM PHOSPHATE 4 MG/ML
INJECTION, SOLUTION INTRA-ARTICULAR; INTRALESIONAL; INTRAMUSCULAR; INTRAVENOUS; SOFT TISSUE PRN
Status: DISCONTINUED | OUTPATIENT
Start: 2024-03-15 | End: 2024-03-15 | Stop reason: SDUPTHER

## 2024-03-15 RX ORDER — SODIUM CHLORIDE 9 MG/ML
INJECTION, SOLUTION INTRAVENOUS CONTINUOUS
Status: DISCONTINUED | OUTPATIENT
Start: 2024-03-15 | End: 2024-03-16

## 2024-03-15 RX ORDER — ENOXAPARIN SODIUM 100 MG/ML
30 INJECTION SUBCUTANEOUS DAILY
Status: DISCONTINUED | OUTPATIENT
Start: 2024-03-16 | End: 2024-03-17

## 2024-03-15 RX ORDER — PANTOPRAZOLE SODIUM 40 MG/1
40 TABLET, DELAYED RELEASE ORAL
Status: DISCONTINUED | OUTPATIENT
Start: 2024-03-16 | End: 2024-03-17 | Stop reason: HOSPADM

## 2024-03-15 RX ORDER — ONDANSETRON 2 MG/ML
INJECTION INTRAMUSCULAR; INTRAVENOUS PRN
Status: DISCONTINUED | OUTPATIENT
Start: 2024-03-15 | End: 2024-03-15 | Stop reason: SDUPTHER

## 2024-03-15 RX ORDER — OXYCODONE HYDROCHLORIDE 5 MG/1
10 TABLET ORAL PRN
Status: DISCONTINUED | OUTPATIENT
Start: 2024-03-15 | End: 2024-03-15 | Stop reason: HOSPADM

## 2024-03-15 RX ORDER — SODIUM CHLORIDE, SODIUM LACTATE, POTASSIUM CHLORIDE, CALCIUM CHLORIDE 600; 310; 30; 20 MG/100ML; MG/100ML; MG/100ML; MG/100ML
INJECTION, SOLUTION INTRAVENOUS CONTINUOUS PRN
Status: DISCONTINUED | OUTPATIENT
Start: 2024-03-15 | End: 2024-03-15 | Stop reason: SDUPTHER

## 2024-03-15 RX ORDER — DOCUSATE SODIUM 100 MG/1
100 CAPSULE, LIQUID FILLED ORAL 2 TIMES DAILY
Status: DISCONTINUED | OUTPATIENT
Start: 2024-03-15 | End: 2024-03-17 | Stop reason: HOSPADM

## 2024-03-15 RX ORDER — SODIUM CHLORIDE 9 MG/ML
INJECTION, SOLUTION INTRAVENOUS PRN
Status: DISCONTINUED | OUTPATIENT
Start: 2024-03-15 | End: 2024-03-17 | Stop reason: HOSPADM

## 2024-03-15 RX ORDER — LIDOCAINE HYDROCHLORIDE 20 MG/ML
INJECTION, SOLUTION EPIDURAL; INFILTRATION; INTRACAUDAL; PERINEURAL PRN
Status: DISCONTINUED | OUTPATIENT
Start: 2024-03-15 | End: 2024-03-15 | Stop reason: SDUPTHER

## 2024-03-15 RX ORDER — POLYETHYLENE GLYCOL 3350 17 G/17G
17 POWDER, FOR SOLUTION ORAL DAILY PRN
Status: DISCONTINUED | OUTPATIENT
Start: 2024-03-15 | End: 2024-03-17 | Stop reason: HOSPADM

## 2024-03-15 RX ORDER — FENTANYL CITRATE 50 UG/ML
INJECTION, SOLUTION INTRAMUSCULAR; INTRAVENOUS PRN
Status: DISCONTINUED | OUTPATIENT
Start: 2024-03-15 | End: 2024-03-15 | Stop reason: SDUPTHER

## 2024-03-15 RX ORDER — ACETAMINOPHEN 325 MG/1
650 TABLET ORAL EVERY 6 HOURS PRN
Status: DISCONTINUED | OUTPATIENT
Start: 2024-03-15 | End: 2024-03-17 | Stop reason: HOSPADM

## 2024-03-15 RX ORDER — OXYCODONE HYDROCHLORIDE 5 MG/1
5 TABLET ORAL PRN
Status: DISCONTINUED | OUTPATIENT
Start: 2024-03-15 | End: 2024-03-15 | Stop reason: HOSPADM

## 2024-03-15 RX ORDER — ACETAMINOPHEN 650 MG/1
650 SUPPOSITORY RECTAL EVERY 6 HOURS PRN
Status: DISCONTINUED | OUTPATIENT
Start: 2024-03-15 | End: 2024-03-17 | Stop reason: HOSPADM

## 2024-03-15 RX ORDER — SODIUM CHLORIDE 0.9 % (FLUSH) 0.9 %
5-40 SYRINGE (ML) INJECTION PRN
Status: DISCONTINUED | OUTPATIENT
Start: 2024-03-15 | End: 2024-03-15 | Stop reason: HOSPADM

## 2024-03-15 RX ORDER — FLUCONAZOLE 2 MG/ML
200 INJECTION, SOLUTION INTRAVENOUS EVERY 24 HOURS
Status: DISCONTINUED | OUTPATIENT
Start: 2024-03-15 | End: 2024-03-17 | Stop reason: HOSPADM

## 2024-03-15 RX ORDER — TAMSULOSIN HYDROCHLORIDE 0.4 MG/1
0.4 CAPSULE ORAL DAILY
Status: DISCONTINUED | OUTPATIENT
Start: 2024-03-15 | End: 2024-03-17 | Stop reason: HOSPADM

## 2024-03-15 RX ORDER — ONDANSETRON 8 MG/1
4 TABLET, ORALLY DISINTEGRATING ORAL EVERY 8 HOURS PRN
Status: DISCONTINUED | OUTPATIENT
Start: 2024-03-15 | End: 2024-03-17 | Stop reason: HOSPADM

## 2024-03-15 RX ORDER — ATORVASTATIN CALCIUM 10 MG/1
10 TABLET, FILM COATED ORAL NIGHTLY
Status: DISCONTINUED | OUTPATIENT
Start: 2024-03-15 | End: 2024-03-17 | Stop reason: HOSPADM

## 2024-03-15 RX ORDER — MAGNESIUM HYDROXIDE 1200 MG/15ML
LIQUID ORAL CONTINUOUS PRN
Status: COMPLETED | OUTPATIENT
Start: 2024-03-15 | End: 2024-03-15

## 2024-03-15 RX ORDER — LABETALOL HYDROCHLORIDE 5 MG/ML
10 INJECTION, SOLUTION INTRAVENOUS
Status: DISCONTINUED | OUTPATIENT
Start: 2024-03-15 | End: 2024-03-15 | Stop reason: HOSPADM

## 2024-03-15 RX ADMIN — PROPOFOL 120 MG: 10 INJECTION, EMULSION INTRAVENOUS at 15:19

## 2024-03-15 RX ADMIN — LIDOCAINE HYDROCHLORIDE 60 MG: 20 INJECTION, SOLUTION EPIDURAL; INFILTRATION; INTRACAUDAL; PERINEURAL at 15:17

## 2024-03-15 RX ADMIN — SODIUM CHLORIDE: 9 INJECTION, SOLUTION INTRAVENOUS at 20:31

## 2024-03-15 RX ADMIN — DEXAMETHASONE SODIUM PHOSPHATE 8 MG: 4 INJECTION, SOLUTION INTRAMUSCULAR; INTRAVENOUS at 15:27

## 2024-03-15 RX ADMIN — SODIUM CHLORIDE, PRESERVATIVE FREE 10 ML: 5 INJECTION INTRAVENOUS at 21:00

## 2024-03-15 RX ADMIN — CEFTRIAXONE SODIUM 1000 MG: 1 INJECTION, POWDER, FOR SOLUTION INTRAMUSCULAR; INTRAVENOUS at 15:25

## 2024-03-15 RX ADMIN — HYDROMORPHONE HYDROCHLORIDE 0.5 MG: 1 INJECTION, SOLUTION INTRAMUSCULAR; INTRAVENOUS; SUBCUTANEOUS at 16:34

## 2024-03-15 RX ADMIN — GLYCOPYRROLATE 0.2 MG: 0.2 INJECTION, SOLUTION INTRAMUSCULAR; INTRAVENOUS at 15:52

## 2024-03-15 RX ADMIN — DOCUSATE SODIUM 100 MG: 100 CAPSULE, LIQUID FILLED ORAL at 22:15

## 2024-03-15 RX ADMIN — ONDANSETRON 4 MG: 2 INJECTION INTRAMUSCULAR; INTRAVENOUS at 15:27

## 2024-03-15 RX ADMIN — FENTANYL CITRATE 50 MCG: 50 INJECTION, SOLUTION INTRAMUSCULAR; INTRAVENOUS at 15:17

## 2024-03-15 RX ADMIN — TAMSULOSIN HYDROCHLORIDE 0.4 MG: 0.4 CAPSULE ORAL at 22:15

## 2024-03-15 RX ADMIN — FLUCONAZOLE 200 MG: 200 INJECTION, SOLUTION INTRAVENOUS at 15:41

## 2024-03-15 RX ADMIN — FENTANYL CITRATE 50 MCG: 50 INJECTION, SOLUTION INTRAMUSCULAR; INTRAVENOUS at 15:38

## 2024-03-15 RX ADMIN — ONDANSETRON 4 MG: 2 INJECTION INTRAMUSCULAR; INTRAVENOUS at 19:08

## 2024-03-15 RX ADMIN — SUGAMMADEX 200 MG: 100 INJECTION, SOLUTION INTRAVENOUS at 15:54

## 2024-03-15 RX ADMIN — HYDROMORPHONE HYDROCHLORIDE 0.5 MG: 1 INJECTION, SOLUTION INTRAMUSCULAR; INTRAVENOUS; SUBCUTANEOUS at 16:51

## 2024-03-15 RX ADMIN — SODIUM CHLORIDE, SODIUM LACTATE, POTASSIUM CHLORIDE, AND CALCIUM CHLORIDE: .6; .31; .03; .02 INJECTION, SOLUTION INTRAVENOUS at 15:13

## 2024-03-15 RX ADMIN — ATORVASTATIN CALCIUM 10 MG: 10 TABLET, FILM COATED ORAL at 22:15

## 2024-03-15 RX ADMIN — ROCURONIUM BROMIDE 40 MG: 50 INJECTION, SOLUTION INTRAVENOUS at 15:19

## 2024-03-15 RX ADMIN — PROCHLORPERAZINE EDISYLATE 10 MG: 5 INJECTION INTRAMUSCULAR; INTRAVENOUS at 20:18

## 2024-03-15 ASSESSMENT — PAIN - FUNCTIONAL ASSESSMENT
PAIN_FUNCTIONAL_ASSESSMENT: ACTIVITIES ARE NOT PREVENTED
PAIN_FUNCTIONAL_ASSESSMENT: 0-10
PAIN_FUNCTIONAL_ASSESSMENT: 0-10

## 2024-03-15 ASSESSMENT — PAIN SCALES - GENERAL
PAINLEVEL_OUTOF10: 8
PAINLEVEL_OUTOF10: 4
PAINLEVEL_OUTOF10: 0
PAINLEVEL_OUTOF10: 6
PAINLEVEL_OUTOF10: 2

## 2024-03-15 ASSESSMENT — PAIN DESCRIPTION - DESCRIPTORS
DESCRIPTORS: BURNING

## 2024-03-15 ASSESSMENT — PAIN DESCRIPTION - ONSET
ONSET: ON-GOING
ONSET: AWAKENED FROM SLEEP

## 2024-03-15 ASSESSMENT — PAIN DESCRIPTION - LOCATION
LOCATION: PENIS
LOCATION: PENIS

## 2024-03-15 ASSESSMENT — PAIN DESCRIPTION - PAIN TYPE
TYPE: ACUTE PAIN
TYPE: ACUTE PAIN

## 2024-03-15 ASSESSMENT — PAIN DESCRIPTION - ORIENTATION
ORIENTATION: INNER
ORIENTATION: INNER

## 2024-03-15 ASSESSMENT — PAIN DESCRIPTION - FREQUENCY
FREQUENCY: INTERMITTENT
FREQUENCY: INTERMITTENT

## 2024-03-15 NOTE — PROGRESS NOTES
D: Patient here from OR via stretcher taken to bay 5, all current drips, treatments, skin issues, and plan of care were reviewed by both RN, patient transferred in stable condition, patient is awake, alert, and oriented, C/O of burning in penis from urinary catheter, call light is in reach. A: Assessment completed and documented, discussed plan of care with patient who agreed.   67 year old female , domiciled in nursing home, with history of COPD, DVT, bipolar, and dementia, multiple prior inpatient psych, was at [facility] for 2 years, recently discharged from inpatient psych [facility] transferred to [facility], sent to the ER for continuous medication non-compliant for the past months, worsening psychosis and agitation.    Patient presents with decompensated psychiatric symptoms, secondary to medication non-compliance. Disorganized behavior noted and underlying psychosis appears to be impairing patient's judgment and behavior. Patient appears to be a danger to herself at this time due to patient unable to care for herself at this time. Patient would benefit form IPP for safety and stabilization.    Thoughts continue to be disorganized, illogical. Patient continues to present with paranoid delusions. Continues to present with poor insight and judgement. Denies depression and anxiety. Denies suicidal/homicidal ideations. Denies A/V hallucinations. Visible on the unit engaging with peers.    #Admit 2PC (9:27)    Plan:    #Schizophrenia  -Haldol 5mg BID  -Haldol dec given 9/21/20  -Haldol dec given 10/19/20  -Haldol Dec given 11/16    #Tobacco Use Disorder  -Nicotine Gum PRN    #COPD  -Duoneb PRN for SOB  -Albuterol inhaler PRN    -Haldol 2mg Q8 PRN for Severe agitation  -Lorazepam 1mg Q8 PRN for anxiety/aggression  -Hydroxyzine 25mg Q12 PRN for anxiety    -Tylenol PRN for pain  -Pantoprazole for GERD  -Maalox PRN for dyspepsia    #Handoff  -Haldol dec given 9/21  -Haldol dec given 10/19  -Haldol Dec given today 11/16  -Duoneb, albuterol inhaler PRN for SOB

## 2024-03-15 NOTE — ANESTHESIA POSTPROCEDURE EVALUATION
Department of Anesthesiology  Postprocedure Note    Patient: Allen Curtis  MRN: [id number]  YOB: 1950  Date of evaluation: 3/15/2024    Procedure Summary       Date: 03/15/24 Room / Location: [address]    Anesthesia Start: 1513 Anesthesia Stop: 1609    Procedure: CYSTOSCOPY, RIGHT URETEROSCOPY, RIGHT URETERAL STENT INSERTION, RETROPYELOGRAM (Right: Bladder) Diagnosis:       Hydronephrosis of right kidney      (Hydronephrosis of right kidney [N13.30])    Surgeons: Dima Zavaleta MD Responsible Provider: Ilan Hollis MD    Anesthesia Type: general ASA Status: 3            Anesthesia Type: No value filed.    Mary Phase I: Mary Score: 10    Mary Phase II:      Anesthesia Post Evaluation    Patient location during evaluation: PACU  Level of consciousness: awake  Airway patency: patent  Nausea & Vomiting: no vomiting  Cardiovascular status: blood pressure returned to baseline  Respiratory status: acceptable  Hydration status: stable  Pain management: adequate    No notable events documented.

## 2024-03-15 NOTE — ED PROVIDER NOTES
[facility]  EMERGENCY DEPARTMENT ENCOUNTER        Pt Name: Allen Curtis  MRN: [id number]  Birthdate 1950  Date of evaluation: 3/15/2024  Provider: TORITO Mir - OLY  PCP: Deepthi Felton MD  Note Started: 11:57 AM EDT 3/15/24      BETH. I have evaluated this patient.        CHIEF COMPLAINT       Chief Complaint   Patient presents with    Flank Pain     R sided flank pain x 1.5 weeks ago, recently had US of kidney and told that he has obstructing stone. Hx of kidney stones in past.        HISTORY OF PRESENT ILLNESS: 1 or more Elements     History From: Patient     Limitations to history : None    Social Determinants Significantly Affecting Health : None    Chief Complaint: Right flank pain. Elevated creatinine.     Allen Curtis is a 73 y.o. male who presents to the emergency department today with symptoms of right flank pain and an elevated creatinine.  The patient presents to the ER with symptoms of right flank pain that started approximately a week ago.  He went to see his nephrologist and they bandar some lab work which displayed a elevated creatinine from 1.1 to now 3.5.  Patient's potassium was 5.3.  Patient had a stat ultrasound performed which displayed significant hydro involving the right kidney.  Also displayed a stone that was quite large.  Patient's nephrologist notified him and encouraged him to come to the hospital for evaluation.    Nursing Notes were all reviewed and agreed with or any disagreements were addressed in the HPI.    REVIEW OF SYSTEMS :      Review of Systems   Constitutional:  Negative for diaphoresis and fever.   HENT:  Negative for congestion and ear pain.    Eyes:  Negative for pain and visual disturbance.   Respiratory:  Negative for cough and shortness of breath.    Cardiovascular:  Negative for chest pain.   Gastrointestinal:  Negative for abdominal pain, nausea and vomiting.   Genitourinary:  Positive for flank pain. Negative for dysuria and frequency.

## 2024-03-15 NOTE — PROGRESS NOTES
Patient admitted to PACU 3 for pre operative state in preparation for surgery, VSS. Consent confirmed. IV in LFA flushed, LR infusing. Belongings Shirt,shoes,pants,hat,undergarments labeled and at bedside. Bilateral hearing aids with Wife, [employer]. NPO since 0800. Family at bedside, phone number in system for text updates, call light within reach.

## 2024-03-15 NOTE — ED PROVIDER NOTES
Emergency Department Attending Provider Note  Location: [facility]  ED  3/15/2024     Patient Identification  Allen Curtis is a 73 y.o. male      Allen Curtis was evaluated in the Emergency Department for right-sided flank pain.     HPI: As noted above    Physical Exam: Well-appearing no acute distress vital signs within normal limits.      EKG Interpretation  Rhythm is normal sinus rhythm  Rate is 60  Axis is normal  No STEMI criteria  Qtc is 400      Patient seen and evaluated.  Relevant records reviewed.  Patient presents with flank pain.  His workup here is notable for a very large UPJ stone 1.2 cm with an associated YEIMY with a creatinine of 3.2.  His pain is relatively controlled.  Urinalysis pending at this time.  Will require admission and urological intervention.  Plan for admission and discussion with urology for further planning    Although initial history and physical exam information was obtained by BETH/NPP/MD/ (who also dictated a record of this visit), I personally saw the patient and made/approved the management plan and take responsibility for patient management. I, Dr. Christianson, am the primary clinician of record.     I personally saw the patient and independently provided 0 minutes of non-concurrent critical care out of the total shared critical care time excluding separately billed procedures.    This chart was generated in part by using Dragon Dictation system and may contain errors related to that system including errors in grammar, punctuation, and spelling, as well as words and phrases that may be inappropriate. If there are any questions or concerns please feel free to contact the dictating provider for clarification.     Kt Christianson MD   [employer]        Kt Christianson MD  03/15/24 9905

## 2024-03-15 NOTE — H&P
Hospital Medicine History & Physical      Date of Admission: 3/15/2024    Date of Service:  Pt seen/examined on 3/15/2024     [x]Admitted to Inpatient with expected LOS greater than two midnights due to medical therapy.  []Placed in Observation status.    Chief Admission Complaint:  YEIMY    Presenting Admission History:      73 y.o. male who presented to [facility] with right flank pain and worsening kidney function.  PMHx significant for diabetes mellitus type 2, chronic kidney disease, kidney stones.  Patient presented to the emergency today due to having right flank pain for the last few days, patient was evaluated by his nephrologist yesterday who did the blood work and found that his creatinine is elevated so decision was made to proceed with ultrasound of the kidney today due to previous history of kidney stone, as the ultrasound came back to be positive for right-sided kidney stone patient was advised to come to the emergency room for evaluation, he denied having any fever, Chills, chest pain, shortness of breath, had mild nausea initially but then it worsened after his surgical intervention.  Patient blood work showed potassium of 5.4, creatinine 3.4 and his baseline is around 1.5 with a BUN of 50, glucose was 144, white blood cells were 7.1, urinalysis showed moderate leukocytosis with white blood cells more than 100 and present yeast in the urine.  CT scan abdomen showed obstructive uropathy with 1.2 cm calculus at the right UPJ area, patient was evaluated by urology was taken to the OR by Dr. Dima Zavaleta where he had right ureteral stent placement after cystourethroscopy.  Patient developed nausea after that and received Zofran which helped to a degree.    Assessment/Plan:      Current Principal Problem:  YEIMY (acute kidney injury) (HCC)    1.  Acute kidney injury most likely related to right kidney stone, placed patient on IV fluid normal saline 75 cc/h, consult was placed for nephrology, recheck

## 2024-03-15 NOTE — PROGRESS NOTES
1900- pt to 438 from pacu vomiting. PRN zofran administered and hospitalist updated. Orders received from hospitalist.

## 2024-03-15 NOTE — CONSULTS
Patient:  Allen Curtis  YOB: 1950   CSN:  [id number]    Referring Provider:  No ref. provider found   Primary Care Provider:  Deepthi Felton MD       Urology Attending Consult Note     Reason for Consultation:  nephrolithiasis, flank pain    Chief Complaint: \"I have a stone\"    HPI:  Allen is a 73 y.o. male who presented with 1 week history of renal colic on right.  YEIMY found by Nephrology and Dr. Clements called regarding rise in creatinine.  Ultrasound as outpatient revealed hydronephrosis and it prompted a CT in th ER which showed a large UPJ stone, 12mm.  No fevers.     Briefly in remote past has had history of stone surgeries including shockwave which had poor success.  He never had a stent at that point    He also had bladder diverticulum and a transurethral section of prostate at the [facility].  They said he had significant issues after that    Eventually had a robotic partial cystectomy to remove the bladder diverticulum    Currently denies bladder emptying issues.    Diabetes is a chronic issue.  Fungus on old urine cultures as a bit unusual but no speciation seen      Social: Wife is present doing majority of the talking but the patient agrees    Past Medical History:   Diagnosis Date    Acute renal failure (ARF) (HCC)     Asthma     BPH (benign prostatic hyperplasia)     Diabetes mellitus, type 2 (HCC)     Hypertension        Past Surgical History:   Procedure Laterality Date    APPENDECTOMY      COLONOSCOPY      CYSTOSCOPY      CYSTOSCOPY  07/26/2017    cysto, bilateral stent exchange, left retrograde pyelogram    HERNIA REPAIR      x 4    OTHER SURGICAL HISTORY      bladder diverticuli excision    TURP      UMBILICAL HERNIA REPAIR N/A 10/23/2020    ROBOTIC RECURRENT INCARCERATED, INCISIONAL  HERNIA REPAIR WITH MESH, BILATERAL POSTERIOR COMPONENT SEPARATION;  CYSTOSCOPY, FLEXIBLE CYSTOSCOPY, URETHRAL DILATATION, 16 FR COUDE CATHETER PLACEMENT performed by Mike Perez DO at  CREATININE 3.4 (H) 03/15/2024    CREATININE 1.0 10/26/2020    CREATININE 1.3 10/25/2020       Lab Results   Component Value Date/Time    COLORU Yellow 03/15/2024 12:29 PM    NITRU Negative 03/15/2024 12:29 PM    GLUCOSEU Negative 03/15/2024 12:29 PM    KETUA Negative 03/15/2024 12:29 PM    UROBILINOGEN 0.2 03/15/2024 12:29 PM    BILIRUBINUR Negative 03/15/2024 12:29 PM     Radiology: \"Imaging was independently reviewed by myself and I agree with the radiology interpretation except as noted above\"  12mm UPJ stone with hydronephrosis on right.   Also some lower pole stone debris on right   Duplex kidney.   Ureter 5-6mm stone  In kidney small nonobstructing stone    Assessment:  Right hydronephrosis secondary to obstruction from calculi, as described above  Uncontrolled flank pain and requests stone removal    Plan:   - Add on to OR for cysto/ Right stent placement, possible uretero, laser lithotripsy.     - R/b/a surg reviewed and include infection/injury/bleeding, secondary procedures.  - broad spectrum IV abx, periop , including antifungals  - NPO  - IVF fluid resuscitation   - flomax 0.4mg qday - can help pass stone and help with stent related pain  - Prn narcotics for pain       Follow up as outpatient will be required for possible repeat imaging and stone prevention and surveillance  And stent removal etc    Dima Zavaleta MD  Office: [phone]

## 2024-03-15 NOTE — DISCHARGE INSTRUCTIONS
***Your ureter was tight and did not allow the camera to get to the stone.    A stent was deployed to help drain your kidney and help with stone pain.    Your stones were broken up but there was a large amount of stone and significant swelling in your kidney so we had to place a kidney drain tube.   The stent was deployed to help drain your kidney and help with stone pain.  This tube must be removed.       Darline will schedule your follow surgery with Dr. Zavaleta.    If you have questions, you can call my nurse Nora, [phone]  Or you can call Darline, the surgery scheduler, at [phone]      Dima Zavaleta MD  [facility]  Main Office: [phone]                 Ureteral Stent Placement: What to Expect at Home  Your Recovery    A ureteral (say \"you-REE-ter-ul\") stent is a thin, hollow tube that is placed in the ureter to help urine pass from the kidney into the bladder. Ureters are the tubes that connect the kidneys to the bladder.  You may have a small amount of blood in your urine for 1 to 3 days after the procedure.  While the stent is in place, you may have to urinate more often, feel a sudden need to urinate, or feel like you cannot completely empty your bladder. You may feel some pain when you urinate or do strenuous activity. You also may notice a small amount of blood in your urine after strenuous activities. These side effects usually do not prevent people from doing their normal daily activities.      After the procedure, urine may flow better from your kidneys to your bladder. A ureteral stent may be left in place for several days or for as long as several months. Your doctor will take it out when you no longer need it.    This care sheet gives you a general idea about how long it will take for you to recover. But each person recovers at a different pace. Follow the steps below to get better as quickly as possible.  How can you care for yourself at home?  Activity    Rest when you feel

## 2024-03-15 NOTE — ED NOTES
ED UROLOGY CONSULT  RE-Right ureteral stone 1.2 cm.  YEIMY with creatinine of 3.4.  Atrophy of left kidney.  [phone]-paged the urology group  [phone]-Raisa Zarco returned page, transferred to Deep CALERO

## 2024-03-15 NOTE — OP NOTE
Operative Note      Patient: Allen Curtis  YOB: 1950  MRN: [id number]    Date of Procedure: 3/15/2024    Pre-Op Diagnosis Codes:     * Hydronephrosis of right kidney [N13.30];   nephrolithiasis     Post-Operative Diagnosis: same, Right Pyonephrosis     Procedure Performed:  1) Cystourethroscopy with right retrograde pyelogram with interpretation   2) right ureteral stent placement         Anesthesia: General  Surgeons/Assistants: scrub   Estimated Blood Loss: less than 2cc   Intravenous Fluids: per anesthesia   Complications: None     Specimens: FUNGAL CULTURE     Findings:   -Short fossa with open bladder neck, ureteral orifice ease were difficult to identify finally saw the right orifice very close to the bladder neck and a slitlike formation    - bladder with cloudy urine; with wire deployed upper right ureter small stone like calcium deposits did flow out.  -No mucosal abnormalities of the bladder   - Retrograde pyelogram showed hydronephrosis, rotated kidney versus infection  -stone visible on fluoro too  -mild Right Pyonephrosis  after stent; no extravasation    Drains/Tubes: 7 Cape Verdean x 22 cm JJ ureteral stent     Indications: Urgently added onto surgery for acute kidney injury right hydronephrosis, large UPJ stone on the right.  History of fungal infections UTIs, complex  [facility].    Description of Procedure:  After obtaining informed consent, the patient was brought to the operating room and placed supine on the operating room table.  After adequate anesthesia, the patient was repositioned in the dorsal lithotomy position and prepped & draped in the standard surgical fashion. Standard sugical time out was performed.  I began the case by doing rigid cystoscopy with a 21-Cape Verdean sheath and a 30-degree lens. Bladder/urethra as above.    I sent off urine sample for fungal culture.  After that was sent off Diflucan was given    I  then eventually found the right ureteral orifice, it was slitlike and very close to the bladder neck.  It was very hard to see both orifices.  Prostatic fossa had the appearance of previous surgery little bit of a step at the bladder neck       Wire was deployed into the right ureter and immediately some debris and stone like material did drop out.  5-Kyrgyz open-ended catheter was deployed, wire removed, retrograde pyelogram was performed by injecting contrast through the catheter (findings above).  I then placed guidewire through the catheter and advanced it into the upper ureter, and curl seen in kidney.  I then advanced a JJ ureteral stent over the wire. An appropriate stent curl was created within the renal pelvis and within the bladder.    Small amount of pyelonephrosis drained through the stent.    I then had use a catheter guide to deploy an 18 Kyrgyz catheter to get it up into the bladder through the angulation     We will leave to gravity until clinically improves.  The patient was then awakened from anesthesia and brought to the PACU in stable condition. There were no apparent complications.      Follow up:  -I will arrange a flexible ureteroscopy, right laser lithotripsy, possible stent exchange a couple weeks  Darline can schedule, orders in  -Harvey removal in 1 to 2 days once clinically improved  -Medicine versus nephrology following for acute kidney injury electrolyte issues they can repeat labs later this evening versus tomorrow morning depending his clinical course    Dima Zavaleta MD  Office: [phone]

## 2024-03-15 NOTE — ANESTHESIA PRE PROCEDURE
Department of Anesthesiology  Preprocedure Note       Name:  Allen Curtis   Age:  73 y.o.  :  1950                                          MRN:  [id number]         Date:  3/15/2024      Surgeon: Surgeon(s):  Dima Zavaleta MD    Procedure: Procedure(s):  CYSTOSCOPY RIGHT  URETERAL STENT INSERTION POSIBLE URETEROSCOPY, POSSIBLE LASER    Medications prior to admission:   Prior to Admission medications    Medication Sig Start Date End Date Taking? Authorizing Provider   metFORMIN (GLUCOPHAGE) 500 MG tablet Take 2 tablets by mouth 2 times daily (with meals) Resume on 10/18/2020 10/18/20   Airs Levine MD   aspirin EC 81 MG EC tablet Take 1 tablet by mouth daily Start on 10/18/2020 10/18/20   Aris Levine MD   atorvastatin (LIPITOR) 10 MG tablet Take 1 tablet by mouth every evening 10/15/20   Aris Levine MD   Acetaminophen (TYLENOL ARTHRITIS PAIN PO) Take by mouth    Unique Hubbard MD   omeprazole (PRILOSEC) 40 MG delayed release capsule Take 1 capsule by mouth daily 7/15/20   Mike Perez DO   losartan (COZAAR) 50 MG tablet Take 1 tablet by mouth Take half pill daily    Unique Hubbard MD   tamsulosin (FLOMAX) 0.4 MG capsule Take 1 capsule by mouth daily    Unique Hubbard MD   Multiple Vitamins-Minerals (THERAPEUTIC MULTIVITAMIN-MINERALS) tablet Take 1 tablet by mouth daily    Unique Hubbard MD   b complex vitamins capsule Take 1 capsule by mouth daily    Unique Hubbard MD   vitamin D 1000 units CAPS Take by mouth    Unique Hubbard MD   latanoprost (XALATAN) 0.005 % ophthalmic solution 1 drop nightly    Unique Hubbard MD   melatonin 5 MG TABS tablet Take 1 tablet by mouth nightly as needed    Unique Hubbard MD   glipiZIDE (GLUCOTROL) 10 MG tablet Take 1 tablet by mouth 4 times daily    Unique Hubbard MD   docusate sodium (COLACE) 100 MG capsule Take 1 capsule by mouth 2 times daily 17   Everett Anderson MD       Current

## 2024-03-15 NOTE — ED NOTES
Pt came in today with a hx of right sided flank pain of over a week. Pt recently had a US of kidneys and told that he had a obstructing stone on the right with hydronephrosis. Pt is awake alert orient x4. Lungs CTA. Monitors in place and family at bedside.

## 2024-03-16 LAB
ALBUMIN SERPL-MCNC: 3.6 G/DL (ref 3.4–5)
ANION GAP SERPL CALCULATED.3IONS-SCNC: 14 MMOL/L (ref 3–16)
ANION GAP SERPL CALCULATED.3IONS-SCNC: 15 MMOL/L (ref 3–16)
BACTERIA UR CULT: NORMAL
BASOPHILS # BLD: 0 K/UL (ref 0–0.2)
BASOPHILS NFR BLD: 0.4 %
BUN SERPL-MCNC: 46 MG/DL (ref 7–20)
BUN SERPL-MCNC: 53 MG/DL (ref 7–20)
CALCIUM SERPL-MCNC: 9 MG/DL (ref 8.3–10.6)
CALCIUM SERPL-MCNC: 9.1 MG/DL (ref 8.3–10.6)
CHLORIDE SERPL-SCNC: 102 MMOL/L (ref 99–110)
CHLORIDE SERPL-SCNC: 97 MMOL/L (ref 99–110)
CO2 SERPL-SCNC: 19 MMOL/L (ref 21–32)
CO2 SERPL-SCNC: 23 MMOL/L (ref 21–32)
CREAT SERPL-MCNC: 2.6 MG/DL (ref 0.8–1.3)
CREAT SERPL-MCNC: 2.7 MG/DL (ref 0.8–1.3)
DEPRECATED RDW RBC AUTO: 14.2 % (ref 12.4–15.4)
EOSINOPHIL # BLD: 0 K/UL (ref 0–0.6)
EOSINOPHIL NFR BLD: 0 %
GFR SERPLBLD CREATININE-BSD FMLA CKD-EPI: 24 ML/MIN/{1.73_M2}
GFR SERPLBLD CREATININE-BSD FMLA CKD-EPI: 25 ML/MIN/{1.73_M2}
GLUCOSE BLD-MCNC: 135 MG/DL (ref 70–99)
GLUCOSE BLD-MCNC: 240 MG/DL (ref 70–99)
GLUCOSE BLD-MCNC: 331 MG/DL (ref 70–99)
GLUCOSE SERPL-MCNC: 184 MG/DL (ref 70–99)
GLUCOSE SERPL-MCNC: 200 MG/DL (ref 70–99)
HCT VFR BLD AUTO: 43.5 % (ref 40.5–52.5)
HGB BLD-MCNC: 14.5 G/DL (ref 13.5–17.5)
LYMPHOCYTES # BLD: 1 K/UL (ref 1–5.1)
LYMPHOCYTES NFR BLD: 14.3 %
MCH RBC QN AUTO: 30.2 PG (ref 26–34)
MCHC RBC AUTO-ENTMCNC: 33.4 G/DL (ref 31–36)
MCV RBC AUTO: 90.3 FL (ref 80–100)
MONOCYTES # BLD: 0.1 K/UL (ref 0–1.3)
MONOCYTES NFR BLD: 2 %
NEUTROPHILS # BLD: 5.7 K/UL (ref 1.7–7.7)
NEUTROPHILS NFR BLD: 83.3 %
PERFORMED ON: ABNORMAL
PHOSPHATE SERPL-MCNC: 3.2 MG/DL (ref 2.5–4.9)
PLATELET # BLD AUTO: 317 K/UL (ref 135–450)
PMV BLD AUTO: 7.2 FL (ref 5–10.5)
POTASSIUM SERPL-SCNC: 5.5 MMOL/L (ref 3.5–5.1)
POTASSIUM SERPL-SCNC: 5.7 MMOL/L (ref 3.5–5.1)
RBC # BLD AUTO: 4.82 M/UL (ref 4.2–5.9)
SODIUM SERPL-SCNC: 134 MMOL/L (ref 136–145)
SODIUM SERPL-SCNC: 136 MMOL/L (ref 136–145)
WBC # BLD AUTO: 6.8 K/UL (ref 4–11)

## 2024-03-16 PROCEDURE — 80069 RENAL FUNCTION PANEL: CPT

## 2024-03-16 PROCEDURE — 2060000000 HC ICU INTERMEDIATE R&B

## 2024-03-16 PROCEDURE — 6360000002 HC RX W HCPCS: Performed by: INTERNAL MEDICINE

## 2024-03-16 PROCEDURE — 2580000003 HC RX 258: Performed by: INTERNAL MEDICINE

## 2024-03-16 PROCEDURE — 6370000000 HC RX 637 (ALT 250 FOR IP): Performed by: INTERNAL MEDICINE

## 2024-03-16 PROCEDURE — 2500000003 HC RX 250 WO HCPCS: Performed by: INTERNAL MEDICINE

## 2024-03-16 PROCEDURE — 6360000002 HC RX W HCPCS: Performed by: UROLOGY

## 2024-03-16 PROCEDURE — 36415 COLL VENOUS BLD VENIPUNCTURE: CPT

## 2024-03-16 PROCEDURE — 6370000000 HC RX 637 (ALT 250 FOR IP): Performed by: STUDENT IN AN ORGANIZED HEALTH CARE EDUCATION/TRAINING PROGRAM

## 2024-03-16 PROCEDURE — 85025 COMPLETE CBC W/AUTO DIFF WBC: CPT

## 2024-03-16 RX ORDER — INSULIN GLARGINE 100 [IU]/ML
0.15 INJECTION, SOLUTION SUBCUTANEOUS NIGHTLY
Status: DISCONTINUED | OUTPATIENT
Start: 2024-03-16 | End: 2024-03-17 | Stop reason: HOSPADM

## 2024-03-16 RX ORDER — INSULIN LISPRO 100 [IU]/ML
0-4 INJECTION, SOLUTION INTRAVENOUS; SUBCUTANEOUS NIGHTLY
Status: DISCONTINUED | OUTPATIENT
Start: 2024-03-16 | End: 2024-03-17 | Stop reason: HOSPADM

## 2024-03-16 RX ORDER — INSULIN LISPRO 100 [IU]/ML
0-16 INJECTION, SOLUTION INTRAVENOUS; SUBCUTANEOUS
Status: DISCONTINUED | OUTPATIENT
Start: 2024-03-16 | End: 2024-03-17 | Stop reason: HOSPADM

## 2024-03-16 RX ADMIN — SODIUM ZIRCONIUM CYCLOSILICATE 10 G: 10 POWDER, FOR SUSPENSION ORAL at 10:17

## 2024-03-16 RX ADMIN — SODIUM BICARBONATE: 84 INJECTION, SOLUTION INTRAVENOUS at 10:18

## 2024-03-16 RX ADMIN — SODIUM BICARBONATE: 84 INJECTION, SOLUTION INTRAVENOUS at 20:15

## 2024-03-16 RX ADMIN — DOCUSATE SODIUM 100 MG: 100 CAPSULE, LIQUID FILLED ORAL at 21:08

## 2024-03-16 RX ADMIN — INSULIN GLARGINE 13 UNITS: 100 INJECTION, SOLUTION SUBCUTANEOUS at 21:09

## 2024-03-16 RX ADMIN — FLUCONAZOLE 200 MG: 200 INJECTION, SOLUTION INTRAVENOUS at 13:45

## 2024-03-16 RX ADMIN — ATORVASTATIN CALCIUM 10 MG: 10 TABLET, FILM COATED ORAL at 21:08

## 2024-03-16 RX ADMIN — ENOXAPARIN SODIUM 30 MG: 100 INJECTION SUBCUTANEOUS at 08:46

## 2024-03-16 RX ADMIN — SODIUM CHLORIDE, PRESERVATIVE FREE 10 ML: 5 INJECTION INTRAVENOUS at 21:10

## 2024-03-16 RX ADMIN — INSULIN LISPRO 12 UNITS: 100 INJECTION, SOLUTION INTRAVENOUS; SUBCUTANEOUS at 12:49

## 2024-03-16 RX ADMIN — DOCUSATE SODIUM 100 MG: 100 CAPSULE, LIQUID FILLED ORAL at 13:37

## 2024-03-16 RX ADMIN — TAMSULOSIN HYDROCHLORIDE 0.4 MG: 0.4 CAPSULE ORAL at 08:45

## 2024-03-16 RX ADMIN — PANTOPRAZOLE SODIUM 40 MG: 40 TABLET, DELAYED RELEASE ORAL at 05:50

## 2024-03-16 RX ADMIN — SODIUM ZIRCONIUM CYCLOSILICATE 10 G: 10 POWDER, FOR SUSPENSION ORAL at 21:09

## 2024-03-16 RX ADMIN — SODIUM ZIRCONIUM CYCLOSILICATE 10 G: 10 POWDER, FOR SUSPENSION ORAL at 13:36

## 2024-03-16 NOTE — CONSULTS
[email or web address]  Nephrology Consult Note           Reason for Consult: YEIMY, hyperkalemia  Requesting Physician:  Dr. Maldonado    Chief Complaint:    Chief Complaint   Patient presents with    Flank Pain     R sided flank pain x 1.5 weeks ago, recently had US of kidney and told that he has obstructing stone. Hx of kidney stones in past.      History of Present Illness on 3/16/2024:    73 y.o. 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 Clements on 3/14/2024 at the office where the patient presented with  flank pain.  His creatinine was 3.5.  Stat ultrasound was obtained on 3/14 which showed right-sided hydronephrosis and nephrolithiasis.  Patient was asked to come to the emergency room where he had a CT scan of abdomen and pelvis which confirmed right UPJ stone of 12 mm in size, subsequently was taken to the OR by Dr. Hinson and had a right ureteral stent placed    Past Medical History:        Diagnosis Date    Acute renal failure (ARF) (HCC)     Asthma     BPH (benign prostatic hyperplasia)     Diabetes mellitus, type 2 (HCC)     Hypertension        Past Surgical History:        Procedure Laterality Date    APPENDECTOMY      COLONOSCOPY      CYSTOSCOPY      CYSTOSCOPY  07/26/2017    cysto, bilateral stent exchange, left retrograde pyelogram    HERNIA REPAIR      x 4    OTHER SURGICAL HISTORY      bladder diverticuli excision    TURP      UMBILICAL HERNIA REPAIR N/A 10/23/2020    ROBOTIC RECURRENT INCARCERATED, INCISIONAL  HERNIA REPAIR WITH MESH, BILATERAL POSTERIOR COMPONENT SEPARATION;  CYSTOSCOPY, FLEXIBLE CYSTOSCOPY, URETHRAL DILATATION, 16 FR COUDE CATHETER PLACEMENT performed by Mike Perez DO at [facility] OR       Home Medications:    No current facility-administered medications on file prior to encounter.     Current Outpatient

## 2024-03-16 NOTE — PLAN OF CARE
Problem: Chronic Conditions and Co-morbidities  Goal: Patient's chronic conditions and co-morbidity symptoms are monitored and maintained or improved  3/16/2024 0500 by Urbano Marks RN  Outcome: Progressing  3/16/2024 0500 by Urbano Marks RN  Outcome: Progressing     Problem: Discharge Planning  Goal: Discharge to home or other facility with appropriate resources  3/16/2024 0500 by Urbano Marks RN  Outcome: Progressing  3/16/2024 0500 by Urbano Marks RN  Outcome: Progressing     Problem: Pain  Goal: Verbalizes/displays adequate comfort level or baseline comfort level  3/16/2024 0500 by Urbano Marks RN  Outcome: Progressing  3/16/2024 0500 by Urbano Marks RN  Outcome: Progressing

## 2024-03-16 NOTE — PROGRESS NOTES
4 Eyes Skin Assessment     NAME:  Allen Curtis  YOB: 1950  MEDICAL RECORD NUMBER:  [id number]    The patient is being assessed for  Admission    I agree that at least one RN has performed a thorough Head to Toe Skin Assessment on the patient. ALL assessment sites listed below have been assessed.      Areas assessed by both nurses:    Head, Face, Ears        Does the Patient have a Wound? No noted wound(s)       Horacio Prevention initiated by RN: No  Wound Care Orders initiated by RN: No    Pressure Injury (Stage 3,4, Unstageable, DTI, NWPT, and Complex wounds) if present, place Wound referral order by RN under : No    New Ostomies, if present place, Ostomy referral order under : No     Nurse 1 eSignature: Electronically signed by Urbano Marks RN on 3/16/24 at 7:09 AM EDT    **SHARE this note so that the co-signing nurse can place an eSignature**    Nurse 2 eSignature: {Esignature:[id number]}

## 2024-03-16 NOTE — PROGRESS NOTES
Today's goals include monitoring trend of potassium. Today K+ went from 5.7 down to 5.5, pt remains on Bicarb drip at 100ml/hr along with TID Joelmka. Per Urology, OK to remove dove, dove d/c'd this evening with no issue, will monitor for patient's ability to void. Pt is A&Ox4, asking appropriate questions, and cooperative with care. Pt's wife, Mesfin, visiting today and updated on plan of care.

## 2024-03-16 NOTE — PROGRESS NOTES
Hospital Medicine Progress Note      Date of Admission: 3/15/2024  Hospital Day: 2    Chief Admission Complaint:  YEIMY     Subjective:  Patient is in hospital chair alert and oriented. No new issues or complaints today. Endorses having pain a site of dove cath insertion. Endorses some flank pain. Denies fevers, chills, sweats. Denies chest pain & palpitations. Denies shortness of breath and cough. Denies nausea, vomiting, or diarrhea. Denies changes in urination. Spoke with nursing staff and was informed of no acute issues overnight.    Presenting Admission History:       73 y.o. male who presented to [facility] with right flank pain and worsening kidney function.  PMHx significant for diabetes mellitus type 2, chronic kidney disease, kidney stones.  Patient presented to the emergency today due to having right flank pain for the last few days, patient was evaluated by his nephrologist yesterday who did the blood work and found that his creatinine is elevated so decision was made to proceed with ultrasound of the kidney today due to previous history of kidney stone, as the ultrasound came back to be positive for right-sided kidney stone patient was advised to come to the emergency room for evaluation, he denied having any fever, Chills, chest pain, shortness of breath, had mild nausea initially but then it worsened after his surgical intervention.  Patient blood work showed potassium of 5.4, creatinine 3.4 and his baseline is around 1.5 with a BUN of 50, glucose was 144, white blood cells were 7.1, urinalysis showed moderate leukocytosis with white blood cells more than 100 and present yeast in the urine.  CT scan abdomen showed obstructive uropathy with 1.2 cm calculus at the right UPJ area, patient was evaluated by urology was taken to the OR by Dr. Dima Zavaleta where he had right ureteral stent placement after cystourethroscopy.  Patient developed nausea after that and received Zofran which helped to a    BILITOT 0.3   ALKPHOS 117     No results for input(s): \"INR\", \"LACTA\", \"TSH\" in the last 72 hours.    Urine Cultures:   Lab Results   Component Value Date/Time    LABURIN No growth to date 03/15/2024 03:27 PM     Blood Cultures:   Lab Results   Component Value Date/Time    BC No growth after 5 days of incubation. 07/25/2017 01:00 PM     No results found for: \"BLOODCULT2\"  Organism:   Lab Results   Component Value Date/Time    ORG Candida albicans 07/26/2017 01:10 PM         Jair Maldonado DO

## 2024-03-16 NOTE — PROGRESS NOTES
Urology Attending Progress Note      Subjective: Doing well. Denies pain.    Vitals:  /78   Pulse 72   Temp 97.8 °F (36.6 °C) (Oral)   Resp 18   Ht 1.676 m (5' 6\")   Wt 89.6 kg (197 lb 9.6 oz)   SpO2 94%   BMI 31.89 kg/m²   Temp  Av.5 °F (36.4 °C)  Min: 96.9 °F (36.1 °C)  Max: 97.8 °F (36.6 °C)    Intake/Output Summary (Last 24 hours) at 3/16/2024 1606  Last data filed at 3/16/2024 1520  Gross per 24 hour   Intake 1200 ml   Output 2150 ml   Net -950 ml       Exam: abd soft and NT  No CVAT  Harvey draining pink urine    Labs:  WBC:    Lab Results   Component Value Date/Time    WBC 6.8 2024 05:15 AM     Hemoglobin/Hematocrit:    Lab Results   Component Value Date/Time    HGB 14.5 2024 05:15 AM    HCT 43.5 2024 05:15 AM     BMP:    Lab Results   Component Value Date/Time     2024 05:15 AM    K 5.7 2024 05:15 AM     2024 05:15 AM    CO2 19 2024 05:15 AM    BUN 46 2024 05:15 AM    LABALBU 3.5 03/15/2024 12:28 PM    CREATININE 2.7 2024 05:15 AM    CALCIUM 9.0 2024 05:15 AM    GFRAA >60 10/26/2020 04:58 AM    LABGLOM 24 2024 05:15 AM     PT/INR:    Lab Results   Component Value Date/Time    PROTIME 11.6 10/15/2020 12:05 PM    INR 1.00 10/15/2020 12:05 PM     PTT:  No results found for: \"APTT\"[APTT        Impression/Plan: 72 yo M with 12 mm R UPJ stone s/p cystoscopy with R stent placement 3/15    Plan:  -cont abx for UTI  -Cr improved  -Will arrange R flex URS in a couple of wks  -ok to remove folely at any time from our standpoint  -Will sign off    Dima Salgado PA-C

## 2024-03-17 VITALS
WEIGHT: 200.9 LBS | OXYGEN SATURATION: 96 % | TEMPERATURE: 97.3 F | HEIGHT: 66 IN | DIASTOLIC BLOOD PRESSURE: 68 MMHG | BODY MASS INDEX: 32.29 KG/M2 | RESPIRATION RATE: 16 BRPM | HEART RATE: 59 BPM | SYSTOLIC BLOOD PRESSURE: 128 MMHG

## 2024-03-17 LAB
ANION GAP SERPL CALCULATED.3IONS-SCNC: 14 MMOL/L (ref 3–16)
BUN SERPL-MCNC: 45 MG/DL (ref 7–20)
CALCIUM SERPL-MCNC: 8.4 MG/DL (ref 8.3–10.6)
CHLORIDE SERPL-SCNC: 104 MMOL/L (ref 99–110)
CO2 SERPL-SCNC: 20 MMOL/L (ref 21–32)
CREAT SERPL-MCNC: 2 MG/DL (ref 0.8–1.3)
GFR SERPLBLD CREATININE-BSD FMLA CKD-EPI: 35 ML/MIN/{1.73_M2}
GLUCOSE BLD-MCNC: 154 MG/DL (ref 70–99)
GLUCOSE BLD-MCNC: 313 MG/DL (ref 70–99)
GLUCOSE SERPL-MCNC: 205 MG/DL (ref 70–99)
PERFORMED ON: ABNORMAL
PERFORMED ON: ABNORMAL
POTASSIUM SERPL-SCNC: 4.3 MMOL/L (ref 3.5–5.1)
SODIUM SERPL-SCNC: 138 MMOL/L (ref 136–145)

## 2024-03-17 PROCEDURE — 36415 COLL VENOUS BLD VENIPUNCTURE: CPT

## 2024-03-17 PROCEDURE — 6370000000 HC RX 637 (ALT 250 FOR IP): Performed by: INTERNAL MEDICINE

## 2024-03-17 PROCEDURE — 2500000003 HC RX 250 WO HCPCS: Performed by: INTERNAL MEDICINE

## 2024-03-17 PROCEDURE — 2580000003 HC RX 258: Performed by: INTERNAL MEDICINE

## 2024-03-17 PROCEDURE — 80048 BASIC METABOLIC PNL TOTAL CA: CPT

## 2024-03-17 PROCEDURE — 6360000002 HC RX W HCPCS: Performed by: STUDENT IN AN ORGANIZED HEALTH CARE EDUCATION/TRAINING PROGRAM

## 2024-03-17 RX ORDER — CIPROFLOXACIN 500 MG/1
500 TABLET, FILM COATED ORAL 2 TIMES DAILY
Qty: 14 TABLET | Refills: 0 | Status: SHIPPED | OUTPATIENT
Start: 2024-03-17 | End: 2024-03-24

## 2024-03-17 RX ORDER — FLUCONAZOLE 100 MG/1
200 TABLET ORAL DAILY
Qty: 28 TABLET | Refills: 0 | Status: SHIPPED | OUTPATIENT
Start: 2024-03-17 | End: 2024-03-31

## 2024-03-17 RX ORDER — ENOXAPARIN SODIUM 100 MG/ML
40 INJECTION SUBCUTANEOUS DAILY
Status: DISCONTINUED | OUTPATIENT
Start: 2024-03-17 | End: 2024-03-17 | Stop reason: HOSPADM

## 2024-03-17 RX ADMIN — TAMSULOSIN HYDROCHLORIDE 0.4 MG: 0.4 CAPSULE ORAL at 10:05

## 2024-03-17 RX ADMIN — SODIUM CHLORIDE, PRESERVATIVE FREE 10 ML: 5 INJECTION INTRAVENOUS at 10:05

## 2024-03-17 RX ADMIN — SODIUM BICARBONATE: 84 INJECTION, SOLUTION INTRAVENOUS at 07:28

## 2024-03-17 RX ADMIN — PANTOPRAZOLE SODIUM 40 MG: 40 TABLET, DELAYED RELEASE ORAL at 05:43

## 2024-03-17 RX ADMIN — DOCUSATE SODIUM 100 MG: 100 CAPSULE, LIQUID FILLED ORAL at 10:05

## 2024-03-17 RX ADMIN — ENOXAPARIN SODIUM 40 MG: 100 INJECTION SUBCUTANEOUS at 10:05

## 2024-03-17 NOTE — PROGRESS NOTES
[email or web address]  Nephrology follow-up note           Reason for Consult: YEIMY, hyperkalemia  Requesting Physician:  Dr. Maldonado    Sub/interval history  Patient is doing well, wants to go home  Improved creatinine  Harvey catheter has been removed    Last 24 h uop 3.9 L    ROS: No chest pain/shortness of breath/fever/nausea/vomiting  PSFH: No visitor    Scheduled Meds:   enoxaparin  40 mg SubCUTAneous Daily    sodium zirconium cyclosilicate  10 g Oral TID    insulin lispro  0-16 Units SubCUTAneous TID WC    insulin lispro  0-4 Units SubCUTAneous Nightly    insulin glargine  0.15 Units/kg SubCUTAneous Nightly    fluconazole  200 mg IntraVENous Q24H    atorvastatin  10 mg Oral Nightly    docusate sodium  100 mg Oral BID    pantoprazole  40 mg Oral QAM AC    tamsulosin  0.4 mg Oral Daily    sodium chloride flush  5-40 mL IntraVENous 2 times per day     Continuous Infusions:   sodium bicarbonate 50 mEq in sodium chloride 0.45 % 1,000 mL infusion 100 mL/hr at 03/17/24 0728    sodium chloride       PRN Meds:.melatonin, sodium chloride flush, sodium chloride, ondansetron **OR** ondansetron, polyethylene glycol, acetaminophen **OR** acetaminophen, prochlorperazine    History of Present Illness on 3/16/2024:    73 y.o. 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 Clements on 3/14/2024 at the office where the patient presented with  flank pain.  His creatinine was 3.5.  Stat ultrasound was obtained on 3/14 which showed right-sided hydronephrosis and nephrolithiasis.  Patient was asked to come to the emergency room where he had a CT scan of abdomen and pelvis which confirmed right UPJ stone of 12 mm in size, subsequently was taken to the OR by Dr. Hinson and had a right ureteral stent placed    Physical exam:   Constitutional:  VITALS:  /68

## 2024-03-17 NOTE — PROGRESS NOTES
Pt ok for discharge per MD. Discharge instructions and script given. IV removed.  No questions or concerns at this time. Transported by wheelchair to personal car.

## 2024-03-17 NOTE — CARE COORDINATION
CM noted discharge order and spoke with patient. Patient denies any needs and spouse is here to transport to home.

## 2024-03-17 NOTE — DISCHARGE SUMMARY
Radiation Exposure Index: Kerma mGy, 0.01 mGy 0.1 minutes fluoroscopy time COMPARISON: None HISTORY: ORDERING SYSTEM PROVIDED HISTORY: CYSTO TECHNOLOGIST PROVIDED HISTORY: Reason for exam:->CYSTO Reason for Exam: or Intraprocedural imaging. FINDINGS: Spot intraoperative images are obtained demonstrating a incompletely visualized right internal ureteral stent with contrast opacification of the right renal pelvis.     Intraprocedural fluoroscopic spot images as above.  See separate procedure report for more information.     FLUORO FOR SURGICAL PROCEDURES    Result Date: 3/15/2024  Fluoro used for positioning purposes only.  No radiologist’s report.     CT ABDOMEN PELVIS WO CONTRAST Additional Contrast? None    Result Date: 3/15/2024  EXAMINATION: STONE PROTOCOL CT OF THE ABDOMEN AND PELVIS 3/15/2024 12:01 pm TECHNIQUE: CT of the abdomen and pelvis was performed without the administration of intravenous contrast. Multiplanar reformatted images are provided for review. Automated exposure control, iterative reconstruction, and/or weight based adjustment of the mA/kV was utilized to reduce the radiation dose to as low as reasonably achievable. COMPARISON: None. HISTORY: ORDERING SYSTEM PROVIDED HISTORY: R flank pain, elevated creatinine TECHNOLOGIST PROVIDED HISTORY: Additional Contrast?->None Reason for exam:->R flank pain, elevated creatinine Decision Support Exception - unselect if not a suspected or confirmed emergency medical condition->Emergency Medical Condition (MA) Reason for Exam: right flank pain, right side feels swollen, no n/v/d; elevated creat; urinary frequency Relevant Medical/Surgical History: hernia surgeryies, hx stones; FINDINGS: LOWER CHEST:  Visualized portion of the lower chest demonstrates no acute abnormality.  Mild nonspecific fibrosis.  Partially imaged coronary artery calcification. KIDNEYS AND URINARY TRACT: Calculus at the left UPJ measuring 1.2 cm with mild right hydronephrosis and  associated inflammatory change.  Mild left renal atrophy.  Additional bilateral calyceal calculi. ORGANS: Visualized portions of the unenhanced liver, spleen, pancreas, and adrenal glands demonstrate no acute abnormality. No inflammatory changes in the gallbladder fossa. GI/BOWEL: No bowel obstruction.  No evidence of acute appendicitis.  Left colonic diverticulosis. PELVIS: Asymmetric thickening of the left bladder wall on series 2, image 140. PERITONEUM/RETROPERITONEUM: No lymphadenopathy is noted. BONES/SOFT TISSUES: The osseous structures demonstrate no acute abnormality.     1. Obstructive uropathy with 1.2 cm calculus at the right UPJ. 2. Asymmetric thickening of the left bladder wall, potentially postsurgical or inflammatory.  Cannot exclude underlying urothelial neoplasm.     US RENAL AND BLADDER    Result Date: 3/14/2024  US KIDNEYS AND BLADDER,  3/14/2024 4:39 PM   CLINICAL HISTORY:  N17.9-Acute kidney failure, unspecified (HCC)-ICD-10-CM. COMPARISON:  None. PROCEDURE COMMENTS: Routine sonographic evaluation of the kidneys and bladder with representative images and sonographer notes sent to PACS for radiologist review. FINDINGS:   RIGHT:  10.5 x 6.0 x 6.4 cm.  There is hydronephrosis. Nephrolithiasis with shadowing noted. No mass lesion. LEFT: 8.3 x 4.8 x 5.1 cm.   No hydronephrosis, solid-appearing mass, or shadowing stone. PELVIS:  No bladder mass identified.     1. Right-sided hydronephrosis with right nephrolithiasis. - Note: Radiology results need to be interpreted within a comprehensive clinical context.  If you have questions about the radiology report, please contact the office of the ordering clinician.      Consults:     IP CONSULT TO UROLOGY  IP CONSULT TO NEPHROLOGY    Labs:     Recent Labs     03/15/24  1228 03/16/24  0515   WBC 7.1 6.8   HGB 14.3 14.5   HCT 42.7 43.5    317     Recent Labs     03/16/24  0515 03/16/24  1539 03/17/24  0508    134* 138   K 5.7* 5.5* 4.3    97*

## 2024-03-18 LAB — BACTERIA UR CULT: NORMAL

## 2024-03-19 LAB
FUNGUS SPEC CULT: ABNORMAL
LOEFFLER MB STN SPEC: ABNORMAL
ORGANISM: ABNORMAL

## 2024-04-15 LAB
FUNGUS SPEC CULT: ABNORMAL
LOEFFLER MB STN SPEC: ABNORMAL
ORGANISM: ABNORMAL

## 2024-09-12 NOTE — PATIENT INSTRUCTIONS

## (undated) DEVICE — TROCAR: Brand: KII OPTICAL ACCESS SYSTEM

## (undated) DEVICE — CATHETER F BLLN 5CC 18FR 2 W HYDRGEL COAT LESS TRAUM LUB

## (undated) DEVICE — JEWISH HOSPITAL TURNOVER KIT: Brand: MEDLINE INDUSTRIES, INC.

## (undated) DEVICE — GOWN,SIRUS,POLYRNF,BRTHSLV,XL,30/CS: Brand: MEDLINE

## (undated) DEVICE — CYSTO: Brand: MEDLINE INDUSTRIES, INC.

## (undated) DEVICE — SURE SET-DOUBLE BASIN-LF: Brand: MEDLINE INDUSTRIES, INC.

## (undated) DEVICE — SURGICAL SET UP - SURE SET: Brand: MEDLINE INDUSTRIES, INC.

## (undated) DEVICE — TRAY CATHETER 16FR F INCLUDE BARDX IC COMPLT CARE DRNGE BG

## (undated) DEVICE — SPONGE,LAP,4"X18",XR,ST,5/PK,40PK/CS: Brand: MEDLINE INDUSTRIES, INC.

## (undated) DEVICE — 40583 XL ADVANCED TRENDELENBURG POSITIONING KIT: Brand: 40583 XL ADVANCED TRENDELENBURG POSITIONING KIT

## (undated) DEVICE — SUTURE VCRL SZ 4-0 L18IN ABSRB UD L19MM PS-2 3/8 CIR PRIM J496H

## (undated) DEVICE — SYSTEM SMK EVAC LAP TBNG FILTER HSNG BENT STYL PNK SEE CLR

## (undated) DEVICE — GUIDEWIRE ENDOSCP L150CM DIA0.035IN TIP 3CM PTFE NIT

## (undated) DEVICE — SOLUTION ANTIFOG VIS SYS CLEARIFY LAPSCP

## (undated) DEVICE — TIP COVER ACCESSORY

## (undated) DEVICE — SYSTEM FIX SHFT L36CM DIA5MM FAST L6.7MM POLY LACTIDE ABSRB

## (undated) DEVICE — FENESTRATED BIPOLAR FORCEPS: Brand: ENDOWRIST

## (undated) DEVICE — SET IRRIG L94IN ID0.281IN W/ 4.5IN DST FLX CONN 2 LD ON OFF

## (undated) DEVICE — MARKER,SKIN,WI/RULER AND LABELS: Brand: MEDLINE

## (undated) DEVICE — PLATE ES AD W 9FT CRD 2

## (undated) DEVICE — TISSUE RETRIEVAL SYSTEM: Brand: INZII RETRIEVAL SYSTEM

## (undated) DEVICE — CANNULA SEAL

## (undated) DEVICE — GLOVE SURG SZ 75 L12IN THK75MIL DK GRN LTX FREE

## (undated) DEVICE — LAPAROSCOPIC SCISSORS: Brand: EPIX LAPAROSCOPIC SCISSORS

## (undated) DEVICE — NEEDLE INSUF L120MM DIA2MM DISP FOR PNEUMOPERI ENDOPATH

## (undated) DEVICE — Device

## (undated) DEVICE — OPEN-END FLEXI-TIP URETERAL CATHETER: Brand: FLEXI-TIP

## (undated) DEVICE — SYRINGE MED 10ML LUERLOCK TIP W/O SFTY DISP

## (undated) DEVICE — NEEDLE HYPO 22GA L1.5IN BLK POLYPR HUB S STL REG BVL STR

## (undated) DEVICE — 3M™ IOBAN™ 2 ANTIMICROBIAL INCISE DRAPE 6648EZ: Brand: IOBAN™ 2

## (undated) DEVICE — ADHESIVE SKIN CLSR 0.7ML TOP DERMBND ADV

## (undated) DEVICE — APPLICATOR MEDICATED 26 CC SOLUTION HI LT ORNG CHLORAPREP

## (undated) DEVICE — BLADELESS OBTURATOR, LONG: Brand: WECK VISTA

## (undated) DEVICE — TROCAR: Brand: KII FIOS FIRST ENTRY

## (undated) DEVICE — ARM DRAPE

## (undated) DEVICE — SCISSORS SURG DIA8MM MPLR CRV ENDOWRIST

## (undated) DEVICE — GLOVE ORANGE PI 7   MSG9070

## (undated) DEVICE — GUIDEWIRE URO L145CM DIA0.035IN TIP L7CM S STL PTFE BENT

## (undated) DEVICE — COLUMN DRAPE

## (undated) DEVICE — DRAPE,LAP,CHOLE,W/TROUGHS,STERILE: Brand: MEDLINE

## (undated) DEVICE — [HIGH FLOW INSUFFLATOR,  DO NOT USE IF PACKAGE IS DAMAGED,  KEEP DRY,  KEEP AWAY FROM SUNLIGHT,  PROTECT FROM HEAT AND RADIOACTIVE SOURCES.]: Brand: PNEUMOSURE

## (undated) DEVICE — STAPLER INT DIA5MM 25 ABSRB STRP FIX DISP FOR HERN MESH

## (undated) DEVICE — COVER,MAYO STAND,XL,STERILE: Brand: MEDLINE

## (undated) DEVICE — GARMENT,MEDLINE,DVT,INT,CALF,MED, GEN2: Brand: MEDLINE

## (undated) DEVICE — E-Z CLEAN, NON-STICK, PTFE COATED, ELECTROSURGICAL BLADE ELECTRODE, 2.5 INCH (6.35 CM): Brand: EZ CLEAN

## (undated) DEVICE — ELECTROSURGICAL PENCIL ROCKER SWITCH NON COATED BLADE ELECTRODE 10 FT (3 M) CORD HOLSTER: Brand: MEGADYNE

## (undated) DEVICE — BLANKET WRM W29.9XL79.1IN UP BODY FORC AIR MISTRAL-AIR

## (undated) DEVICE — BINDER ABD H12IN FOR 62-74IN WAIST UNIV 4 PNL PREM DSGN E

## (undated) DEVICE — URETHRAL DILATION / ACCESS SET: Brand: COOK

## (undated) DEVICE — CATHETER URETH 16FR BLLN 5CC 2 W F SPEC M OLV COUDE TIP

## (undated) DEVICE — CONTAINER,SPECIMEN,OR STERILE,4OZ: Brand: MEDLINE